# Patient Record
Sex: FEMALE | Race: BLACK OR AFRICAN AMERICAN | NOT HISPANIC OR LATINO
[De-identification: names, ages, dates, MRNs, and addresses within clinical notes are randomized per-mention and may not be internally consistent; named-entity substitution may affect disease eponyms.]

---

## 2023-02-02 ENCOUNTER — APPOINTMENT (OUTPATIENT)
Dept: SURGICAL ONCOLOGY | Facility: CLINIC | Age: 53
End: 2023-02-02
Payer: COMMERCIAL

## 2023-02-02 ENCOUNTER — RESULT REVIEW (OUTPATIENT)
Age: 53
End: 2023-02-02

## 2023-02-02 PROCEDURE — 19083 BX BREAST 1ST LESION US IMAG: CPT

## 2023-02-02 PROCEDURE — 99205 OFFICE O/P NEW HI 60 MIN: CPT | Mod: 25

## 2023-02-03 PROBLEM — Z00.00 ENCOUNTER FOR PREVENTIVE HEALTH EXAMINATION: Status: ACTIVE | Noted: 2023-02-03

## 2023-02-09 ENCOUNTER — NON-APPOINTMENT (OUTPATIENT)
Age: 53
End: 2023-02-09

## 2023-02-11 ENCOUNTER — TRANSCRIPTION ENCOUNTER (OUTPATIENT)
Age: 53
End: 2023-02-11

## 2023-02-11 ENCOUNTER — APPOINTMENT (OUTPATIENT)
Dept: MRI IMAGING | Facility: CLINIC | Age: 53
End: 2023-02-11
Payer: COMMERCIAL

## 2023-02-11 ENCOUNTER — OUTPATIENT (OUTPATIENT)
Dept: OUTPATIENT SERVICES | Facility: HOSPITAL | Age: 53
LOS: 1 days | End: 2023-02-11

## 2023-02-11 PROCEDURE — 77049 MRI BREAST C-+ W/CAD BI: CPT | Mod: 26

## 2023-02-22 ENCOUNTER — OUTPATIENT (OUTPATIENT)
Dept: OUTPATIENT SERVICES | Facility: HOSPITAL | Age: 53
LOS: 1 days | End: 2023-02-22
Payer: COMMERCIAL

## 2023-02-22 ENCOUNTER — RESULT REVIEW (OUTPATIENT)
Age: 53
End: 2023-02-22

## 2023-02-22 ENCOUNTER — APPOINTMENT (OUTPATIENT)
Dept: ULTRASOUND IMAGING | Facility: IMAGING CENTER | Age: 53
End: 2023-02-22
Payer: COMMERCIAL

## 2023-02-22 DIAGNOSIS — C50.911 MALIGNANT NEOPLASM OF UNSPECIFIED SITE OF RIGHT FEMALE BREAST: ICD-10-CM

## 2023-02-22 PROCEDURE — 88305 TISSUE EXAM BY PATHOLOGIST: CPT | Mod: 26

## 2023-02-22 PROCEDURE — 77065 DX MAMMO INCL CAD UNI: CPT

## 2023-02-22 PROCEDURE — A4648: CPT

## 2023-02-22 PROCEDURE — 19084 BX BREAST ADD LESION US IMAG: CPT

## 2023-02-22 PROCEDURE — 19083 BX BREAST 1ST LESION US IMAG: CPT | Mod: LT

## 2023-02-22 PROCEDURE — 19083 BX BREAST 1ST LESION US IMAG: CPT

## 2023-02-22 PROCEDURE — 77065 DX MAMMO INCL CAD UNI: CPT | Mod: 26,LT

## 2023-02-22 PROCEDURE — 19084 BX BREAST ADD LESION US IMAG: CPT | Mod: RT

## 2023-02-22 PROCEDURE — 88305 TISSUE EXAM BY PATHOLOGIST: CPT

## 2023-02-23 ENCOUNTER — OUTPATIENT (OUTPATIENT)
Dept: OUTPATIENT SERVICES | Facility: HOSPITAL | Age: 53
LOS: 1 days | End: 2023-02-23
Payer: COMMERCIAL

## 2023-02-23 VITALS
DIASTOLIC BLOOD PRESSURE: 80 MMHG | WEIGHT: 179.9 LBS | HEART RATE: 79 BPM | OXYGEN SATURATION: 98 % | HEIGHT: 63 IN | TEMPERATURE: 98 F | SYSTOLIC BLOOD PRESSURE: 150 MMHG | RESPIRATION RATE: 16 BRPM

## 2023-02-23 DIAGNOSIS — I10 ESSENTIAL (PRIMARY) HYPERTENSION: ICD-10-CM

## 2023-02-23 DIAGNOSIS — C50.911 MALIGNANT NEOPLASM OF UNSPECIFIED SITE OF RIGHT FEMALE BREAST: ICD-10-CM

## 2023-02-23 DIAGNOSIS — Z98.890 OTHER SPECIFIED POSTPROCEDURAL STATES: Chronic | ICD-10-CM

## 2023-02-23 LAB
ANION GAP SERPL CALC-SCNC: 12 MMOL/L — SIGNIFICANT CHANGE UP (ref 7–14)
BUN SERPL-MCNC: 10 MG/DL — SIGNIFICANT CHANGE UP (ref 7–23)
CALCIUM SERPL-MCNC: 9.9 MG/DL — SIGNIFICANT CHANGE UP (ref 8.4–10.5)
CHLORIDE SERPL-SCNC: 103 MMOL/L — SIGNIFICANT CHANGE UP (ref 98–107)
CO2 SERPL-SCNC: 24 MMOL/L — SIGNIFICANT CHANGE UP (ref 22–31)
CREAT SERPL-MCNC: 0.81 MG/DL — SIGNIFICANT CHANGE UP (ref 0.5–1.3)
EGFR: 88 ML/MIN/1.73M2 — SIGNIFICANT CHANGE UP
GLUCOSE SERPL-MCNC: 91 MG/DL — SIGNIFICANT CHANGE UP (ref 70–99)
HCG SERPL-ACNC: <5 MIU/ML — SIGNIFICANT CHANGE UP
HCT VFR BLD CALC: 39 % — SIGNIFICANT CHANGE UP (ref 34.5–45)
HGB BLD-MCNC: 12.6 G/DL — SIGNIFICANT CHANGE UP (ref 11.5–15.5)
MCHC RBC-ENTMCNC: 28.4 PG — SIGNIFICANT CHANGE UP (ref 27–34)
MCHC RBC-ENTMCNC: 32.3 GM/DL — SIGNIFICANT CHANGE UP (ref 32–36)
MCV RBC AUTO: 87.8 FL — SIGNIFICANT CHANGE UP (ref 80–100)
NRBC # BLD: 0 /100 WBCS — SIGNIFICANT CHANGE UP (ref 0–0)
NRBC # FLD: 0 K/UL — SIGNIFICANT CHANGE UP (ref 0–0)
PLATELET # BLD AUTO: 308 K/UL — SIGNIFICANT CHANGE UP (ref 150–400)
POTASSIUM SERPL-MCNC: 3.5 MMOL/L — SIGNIFICANT CHANGE UP (ref 3.5–5.3)
POTASSIUM SERPL-SCNC: 3.5 MMOL/L — SIGNIFICANT CHANGE UP (ref 3.5–5.3)
RBC # BLD: 4.44 M/UL — SIGNIFICANT CHANGE UP (ref 3.8–5.2)
RBC # FLD: 13.7 % — SIGNIFICANT CHANGE UP (ref 10.3–14.5)
SODIUM SERPL-SCNC: 139 MMOL/L — SIGNIFICANT CHANGE UP (ref 135–145)
WBC # BLD: 5.57 K/UL — SIGNIFICANT CHANGE UP (ref 3.8–10.5)
WBC # FLD AUTO: 5.57 K/UL — SIGNIFICANT CHANGE UP (ref 3.8–10.5)

## 2023-02-23 PROCEDURE — 93010 ELECTROCARDIOGRAM REPORT: CPT

## 2023-02-23 NOTE — H&P PST ADULT - NSICDXPASTMEDICALHX_GEN_ALL_CORE_FT
PAST MEDICAL HISTORY:  HLD (hyperlipidemia)     HTN (hypertension)     Malignant neoplasm of right female breast
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
## 2023-02-23 NOTE — H&P PST ADULT - PROBLEM SELECTOR PLAN 1
Patient tentatively scheduled for  right breast lumpectomy mag seed localization x1 site right axillary sentinel node biopsy for 3/6/23. Pre-op instructions provided. Pt given verbal and written instructions with teach back on chlorhexidine shampoo and pepcid. Pt verbalized understanding with return demonstration.     Pt instructed to obtain a COVID-19 PCR 3-5 days prior to the procedure. COVID-19 PCR order placed. Pt was provided with a list of COVID-19 PCR swabbing locations and hours of operation. Pt stated understanding.     CBC BMP HCG and EKG done.  Low risk patient scheduled for a low risk procedure.  No further evaluations requested.

## 2023-02-23 NOTE — H&P PST ADULT - NSICDXFAMILYHX_GEN_ALL_CORE_FT
FAMILY HISTORY:  Father  Still living? Unknown  FHx: lung cancer, Age at diagnosis: Age Unknown    Mother  Still living? Unknown  FHx: lung cancer, Age at diagnosis: Age Unknown

## 2023-02-23 NOTE — H&P PST ADULT - ASSESSMENT
In the ED, patient was hemodynamically stable and saturating well on room air. Labs with leukocytosis (13.6), elevated procal, UA negative, blood cultures drawn, flu and covid negative. CXR with interstitial opacities suspicious for mild edema or interstitial pneumonia. Patient was given rocephin and azithromycin and admitted to hospital medicine. Discussed with patient and her son and confirmed DNR status. Her white count improved and patient was afebrile, hemodynamically stable and asymptomatic. She was tolerating po and was at her baseline. Discussed with patient's son plan of care. She was subsequently discharged to complete po course of antibiotics for cap treatment.   
malignant neoplasm of right female breast

## 2023-03-01 ENCOUNTER — NON-APPOINTMENT (OUTPATIENT)
Age: 53
End: 2023-03-01

## 2023-03-01 PROBLEM — I10 ESSENTIAL (PRIMARY) HYPERTENSION: Chronic | Status: ACTIVE | Noted: 2023-02-23

## 2023-03-01 PROBLEM — C50.911 MALIGNANT NEOPLASM OF UNSPECIFIED SITE OF RIGHT FEMALE BREAST: Chronic | Status: ACTIVE | Noted: 2023-02-23

## 2023-03-01 PROBLEM — E78.5 HYPERLIPIDEMIA, UNSPECIFIED: Chronic | Status: ACTIVE | Noted: 2023-02-23

## 2023-03-02 ENCOUNTER — APPOINTMENT (OUTPATIENT)
Dept: ULTRASOUND IMAGING | Facility: CLINIC | Age: 53
End: 2023-03-02
Payer: COMMERCIAL

## 2023-03-02 ENCOUNTER — RESULT REVIEW (OUTPATIENT)
Age: 53
End: 2023-03-02

## 2023-03-02 PROCEDURE — 19285Z: CUSTOM | Mod: RT

## 2023-03-02 PROCEDURE — 19286Z: CUSTOM | Mod: RT

## 2023-03-03 VITALS
DIASTOLIC BLOOD PRESSURE: 94 MMHG | WEIGHT: 179.9 LBS | OXYGEN SATURATION: 99 % | SYSTOLIC BLOOD PRESSURE: 156 MMHG | HEART RATE: 74 BPM | TEMPERATURE: 98 F | HEIGHT: 63 IN | RESPIRATION RATE: 18 BRPM

## 2023-03-03 NOTE — ASU PREOPERATIVE ASSESSMENT, ADULT (IPARK ONLY) - FALL HARM RISK - UNIVERSAL INTERVENTIONS
Bed in lowest position, wheels locked, appropriate side rails in place/Call bell, personal items and telephone in reach/Instruct patient to call for assistance before getting out of bed or chair/Non-slip footwear when patient is out of bed/[place] to call system/Physically safe environment - no spills, clutter or unnecessary equipment/Purposeful Proactive Rounding/Room/bathroom lighting operational, light cord in reach

## 2023-03-05 ENCOUNTER — TRANSCRIPTION ENCOUNTER (OUTPATIENT)
Age: 53
End: 2023-03-05

## 2023-03-06 ENCOUNTER — OUTPATIENT (OUTPATIENT)
Dept: OUTPATIENT SERVICES | Facility: HOSPITAL | Age: 53
LOS: 1 days | Discharge: HOME | End: 2023-03-06
Payer: COMMERCIAL

## 2023-03-06 ENCOUNTER — OUTPATIENT (OUTPATIENT)
Dept: OUTPATIENT SERVICES | Facility: HOSPITAL | Age: 53
LOS: 1 days | End: 2023-03-06
Payer: COMMERCIAL

## 2023-03-06 ENCOUNTER — APPOINTMENT (OUTPATIENT)
Dept: NUCLEAR MEDICINE | Facility: IMAGING CENTER | Age: 53
End: 2023-03-06

## 2023-03-06 ENCOUNTER — TRANSCRIPTION ENCOUNTER (OUTPATIENT)
Age: 53
End: 2023-03-06

## 2023-03-06 ENCOUNTER — APPOINTMENT (OUTPATIENT)
Dept: MAMMOGRAPHY | Facility: IMAGING CENTER | Age: 53
End: 2023-03-06
Payer: COMMERCIAL

## 2023-03-06 ENCOUNTER — RESULT REVIEW (OUTPATIENT)
Age: 53
End: 2023-03-06

## 2023-03-06 ENCOUNTER — APPOINTMENT (OUTPATIENT)
Dept: SURGICAL ONCOLOGY | Facility: AMBULATORY SURGERY CENTER | Age: 53
End: 2023-03-06

## 2023-03-06 VITALS
TEMPERATURE: 97 F | SYSTOLIC BLOOD PRESSURE: 112 MMHG | HEART RATE: 74 BPM | DIASTOLIC BLOOD PRESSURE: 64 MMHG | OXYGEN SATURATION: 97 %

## 2023-03-06 DIAGNOSIS — C50.911 MALIGNANT NEOPLASM OF UNSPECIFIED SITE OF RIGHT FEMALE BREAST: ICD-10-CM

## 2023-03-06 DIAGNOSIS — Z98.890 OTHER SPECIFIED POSTPROCEDURAL STATES: Chronic | ICD-10-CM

## 2023-03-06 DIAGNOSIS — Z00.8 ENCOUNTER FOR OTHER GENERAL EXAMINATION: ICD-10-CM

## 2023-03-06 PROCEDURE — 19301 PARTIAL MASTECTOMY: CPT | Mod: RT

## 2023-03-06 PROCEDURE — 76098 X-RAY EXAM SURGICAL SPECIMEN: CPT

## 2023-03-06 PROCEDURE — 88342 IMHCHEM/IMCYTCHM 1ST ANTB: CPT | Mod: 26

## 2023-03-06 PROCEDURE — 88307 TISSUE EXAM BY PATHOLOGIST: CPT | Mod: 26

## 2023-03-06 PROCEDURE — 76098 X-RAY EXAM SURGICAL SPECIMEN: CPT | Mod: 26

## 2023-03-06 PROCEDURE — 38900 IO MAP OF SENT LYMPH NODE: CPT | Mod: RT

## 2023-03-06 PROCEDURE — 88377 M/PHMTRC ALYS ISHQUANT/SEMIQ: CPT | Mod: 26

## 2023-03-06 PROCEDURE — 38792 RA TRACER ID OF SENTINL NODE: CPT | Mod: 59,RT

## 2023-03-06 PROCEDURE — 88360 TUMOR IMMUNOHISTOCHEM/MANUAL: CPT | Mod: 26

## 2023-03-06 PROCEDURE — 88341 IMHCHEM/IMCYTCHM EA ADD ANTB: CPT | Mod: 26

## 2023-03-06 PROCEDURE — 38525 BIOPSY/REMOVAL LYMPH NODES: CPT | Mod: RT

## 2023-03-06 PROCEDURE — 14001 TIS TRNFR TRUNK 10.1-30SQCM: CPT

## 2023-03-06 PROCEDURE — 88305 TISSUE EXAM BY PATHOLOGIST: CPT | Mod: 26

## 2023-03-06 DEVICE — SURGICEL FIBRILLAR 2 X 4": Type: IMPLANTABLE DEVICE | Status: FUNCTIONAL

## 2023-03-06 DEVICE — ARISTA 3GR: Type: IMPLANTABLE DEVICE | Status: FUNCTIONAL

## 2023-03-06 RX ORDER — ACETAMINOPHEN 500 MG
1 TABLET ORAL
Qty: 28 | Refills: 0
Start: 2023-03-06 | End: 2023-03-12

## 2023-03-06 RX ORDER — ACETAMINOPHEN 500 MG
1 TABLET ORAL
Qty: 0 | Refills: 0 | DISCHARGE

## 2023-03-06 RX ORDER — OXYCODONE HYDROCHLORIDE 5 MG/1
1 TABLET ORAL
Qty: 12 | Refills: 0
Start: 2023-03-06

## 2023-03-06 NOTE — ASU DISCHARGE PLAN (ADULT/PEDIATRIC) - NURSING INSTRUCTIONS
Progress to regular diet as tolerated.  Keep well hydrated.   When taking pain/narcotic medications - take with food as it can cause nausea if taken on an empty stomach, and know it may cause constipation. To prevent constipation increase fluids and fiber in diet. You may take stool softeners (such as Colace) and follow directions as printed on bottle. - Do NOT drive while on narcotics.   Next dose of Tylenol at 5pm

## 2023-03-06 NOTE — ASU DISCHARGE PLAN (ADULT/PEDIATRIC) - ASU DC SPECIAL INSTRUCTIONSFT
[facility]  CANCER  I  N  S  T  I  T  U  T E     Department of Surgery  Division of Surgical Oncology    Rene Galindo M.D., SUNIL.  Chief, Division of Surgical Oncology    Shamar Jensen M.D., F.A.C.S., F.A.S.LOUISE.  Associate , Department of Surgery    Saeid Allred M.D., ILDA.NURIA.C.S.  Everardo Hidalgo M.D., F.NURIA.C.S.  Max Love M.D., F.A.C.S.  Cralos Valencia M.D., F.A.C.S.  Everardo Ritchie M.D., F.A.C.S.  Surjit Byrne M.D., FELICEC.S.      Breast Biopsy/Lumpectomy Post-operative Instructions  1.	Supportive bra- Please bring a sports/athletic bra with you on the day of your surgery.  You will wear it home from the hospital.  You should wear the supportive bra continuously for 48 hours after the procedure, including wearing it to sleep.  Therefore, you may wear your regular bra.  You may remove the bra to shower.  The sports bra will provide support, decrease the amount of swelling at the biopsy site, and make your recovery more comfortable.    2.	Wound dressing- There are white surgical tapes (called steri strips) which are directly adherent to the skin.  These should remain on the skin, and will peel off naturally.  All of the stitches are “internal” and will dissolve naturally.    3.	Showering/Bathing- You may shower over the dressing the very next day after surgery.  Allow the water to run over the dressing, but don not scrub the area.  It is best not to sit in a bathtub or swimming pool for at least one week after surgery.    4.	Activity level- You may resume most normal daily activity as tolerated, but avoid strenuous activities such as aerobics, jogging, exercising or heavy lifting for at least 1 week after surgery.  You may return to work in 1-2 days after surgery.  You may drive as long as you are not taking any prescription pain medication.    5.	Pain Medication- You may take the prescribed medication, or you may take extra-strength Tylenol as needed.  Please don't take aspirin, Motrin, Advil or any other anti-inflammatory medications, as these medications may cause bleeding or bruising.    6.	Follow-up Appointment- Please call the office to schedule your post-operative appointment which should be approximately 10 days after your surgery. Office [phone]    7.	Bruising/Bleeding/Swelling- It is normal for there to be some bruising and swelling at the breast biopsy site, and there may be some staining of blood on to the dressing.  Some discomfort at the surgical site is expected.  If your symptoms seem excessive, or if you have any question or concerns, please call the office.      Anesthesia Precautions:  For the next 12 hours do not:   •	drive a car,  •	drink alcohol, beer, or wine,   •	make important personal or business decisions  Diet:   •	Progress diet slowly unless otherwise indicated    Physician Notification  •	Any Prescription issues  •	Bleeding that does not stop  •	Persistent nausea and vomiting  •	Pain not relieved by medications  •	Fever greater than 101®F  •	Inability to tolerate liquids or foods  •	Unable to urinate after 8 hours  Discharge and Disposition  •	Discharge to home/ group home/assisted living  •	Accompanied by Family/Spouse/ Parents/ Significant Other/ Friend/ and or Caregiver    Follow Up Care:  •	In the event that you develop a complication and you are unable to reach your own physician, you may contact:  [phone] or go to the nearest Emergency Room.   •	Please call your surgeon to schedule your follow up appointment [phone] [facility]  CANCER  I  N  S  T  I  T  U  T E     Department of Surgery  Division of Surgical Oncology    Rene Galindo M.D., SUNIL.  Chief, Division of Surgical Oncology    Shamar Jensen M.D., F.A.C.S., F.A.S.LOUISE.  Associate , Department of Surgery    Saeid Allred M.D., ILDA.NURIA.C.S.  Everardo Hidalgo M.D., F.NURIA.C.S.  Max Love M.D., F.A.C.S.  Carlos Valencia M.D., F.A.C.S.  Everardo Ritchie M.D., F.A.C.S.  Surjit Byrne M.D., FELICEC.S.      Breast Biopsy/Lumpectomy Post-operative Instructions  1.	Supportive bra- Please bring a sports/athletic bra with you on the day of your surgery.  You will wear it home from the hospital.  You should wear the supportive bra continuously for 48 hours after the procedure, including wearing it to sleep.  Therefore, you may wear your regular bra.  You may remove the bra to shower.  The sports bra will provide support, decrease the amount of swelling at the biopsy site, and make your recovery more comfortable.        2.	Wound dressing- There are white surgical tapes (called steri strips) which are directly adherent to the skin.  These should remain on the skin, and will peel off naturally.  All of the stitches are “internal” and will dissolve naturally.        3.	Showering/Bathing- You may shower over the dressing the very next day after surgery.  Allow the water to run over the dressing, but don not scrub the area.  It is best not to sit in a bathtub or swimming pool for at least one week after surgery.        4.	Activity level- You may resume most normal daily activity as tolerated, but avoid strenuous activities such as aerobics, jogging, exercising or heavy lifting for at least 1 week after surgery.  You may return to work in 1-2 days after surgery.  You may drive as long as you are not taking any prescription pain medication.        5.	Pain Medication- You may take the prescribed medication, or you may take extra-strength Tylenol as needed.  Please don't take aspirin, Motrin, Advil or any other anti-inflammatory medications, as these medications may cause bleeding or bruising.        6.	Follow-up Appointment- Please call the office to schedule your post-operative appointment which should be approximately 10 days after your surgery. Office [phone]        7.	Bruising/Bleeding/Swelling- It is normal for there to be some bruising and swelling at the breast biopsy site, and there may be some staining of blood on to the dressing.  Some discomfort at the surgical site is expected.  If your symptoms seem excessive, or if you have any question or concerns, please call the office.          Anesthesia Precautions:  For the next 12 hours do not:   •	drive a car,  •	drink alcohol, beer, or wine,   •	make important personal or business decisions  Diet:   •	Progress diet slowly unless otherwise indicated    Physician Notification  •	Any Prescription issues  •	Bleeding that does not stop  •	Persistent nausea and vomiting  •	Pain not relieved by medications  •	Fever greater than 101®F  •	Inability to tolerate liquids or foods  •	Unable to urinate after 8 hours  Discharge and Disposition  •	Discharge to home/ group home/assisted living  •	Accompanied by Family/Spouse/ Parents/ Significant Other/ Friend/ and or Caregiver    Follow Up Care:  •	In the event that you develop a complication and you are unable to reach your own physician, you may contact:  [phone] or go to the nearest Emergency Room.   •	Please call your surgeon to schedule your follow up appointment [phone]

## 2023-03-06 NOTE — ASU DISCHARGE PLAN (ADULT/PEDIATRIC) - DISCHARGE TO
Chief Complaint   Patient presents with    Chest Pain Adult    Shortness of Breath     Pt presents to ED with complaints of chest pain and shortness of breath. Patient describes pain as bilateral rib pain. Endorses cough, congestion, sore throat, and vomiting. Patient states that her son has influenza A and patient has not received a flu shot this year.    
Home

## 2023-03-14 ENCOUNTER — NON-APPOINTMENT (OUTPATIENT)
Age: 53
End: 2023-03-14

## 2023-03-14 LAB — SURGICAL PATHOLOGY STUDY: SIGNIFICANT CHANGE UP

## 2023-03-16 ENCOUNTER — APPOINTMENT (OUTPATIENT)
Dept: SURGICAL ONCOLOGY | Facility: CLINIC | Age: 53
End: 2023-03-16

## 2023-03-16 ENCOUNTER — RESULT REVIEW (OUTPATIENT)
Age: 53
End: 2023-03-16

## 2023-03-21 ENCOUNTER — OUTPATIENT (OUTPATIENT)
Dept: OUTPATIENT SERVICES | Facility: HOSPITAL | Age: 53
LOS: 1 days | End: 2023-03-21
Payer: COMMERCIAL

## 2023-03-21 ENCOUNTER — APPOINTMENT (OUTPATIENT)
Dept: NUCLEAR MEDICINE | Facility: IMAGING CENTER | Age: 53
End: 2023-03-21
Payer: COMMERCIAL

## 2023-03-21 ENCOUNTER — APPOINTMENT (OUTPATIENT)
Dept: CT IMAGING | Facility: IMAGING CENTER | Age: 53
End: 2023-03-21
Payer: COMMERCIAL

## 2023-03-21 DIAGNOSIS — Z98.890 OTHER SPECIFIED POSTPROCEDURAL STATES: Chronic | ICD-10-CM

## 2023-03-21 DIAGNOSIS — Z00.8 ENCOUNTER FOR OTHER GENERAL EXAMINATION: ICD-10-CM

## 2023-03-21 DIAGNOSIS — C50.911 MALIGNANT NEOPLASM OF UNSPECIFIED SITE OF RIGHT FEMALE BREAST: ICD-10-CM

## 2023-03-21 PROCEDURE — 71260 CT THORAX DX C+: CPT | Mod: 26

## 2023-03-21 PROCEDURE — A9561: CPT

## 2023-03-21 PROCEDURE — 74177 CT ABD & PELVIS W/CONTRAST: CPT

## 2023-03-21 PROCEDURE — 74177 CT ABD & PELVIS W/CONTRAST: CPT | Mod: 26

## 2023-03-21 PROCEDURE — 78306 BONE IMAGING WHOLE BODY: CPT | Mod: 26

## 2023-03-21 PROCEDURE — 71260 CT THORAX DX C+: CPT

## 2023-03-21 PROCEDURE — 78306 BONE IMAGING WHOLE BODY: CPT

## 2023-03-22 ENCOUNTER — APPOINTMENT (OUTPATIENT)
Dept: SURGICAL ONCOLOGY | Facility: CLINIC | Age: 53
End: 2023-03-22

## 2023-03-23 ENCOUNTER — OUTPATIENT (OUTPATIENT)
Dept: OUTPATIENT SERVICES | Facility: HOSPITAL | Age: 53
LOS: 1 days | Discharge: HOME | End: 2023-03-23

## 2023-03-23 ENCOUNTER — APPOINTMENT (OUTPATIENT)
Age: 53
End: 2023-03-23
Payer: COMMERCIAL

## 2023-03-23 ENCOUNTER — NON-APPOINTMENT (OUTPATIENT)
Age: 53
End: 2023-03-23

## 2023-03-23 ENCOUNTER — APPOINTMENT (OUTPATIENT)
Dept: MULTI SPECIALTY CLINIC | Facility: CLINIC | Age: 53
End: 2023-03-23
Payer: COMMERCIAL

## 2023-03-23 VITALS — RESPIRATION RATE: 18 BRPM | SYSTOLIC BLOOD PRESSURE: 132 MMHG | HEART RATE: 80 BPM | DIASTOLIC BLOOD PRESSURE: 82 MMHG

## 2023-03-23 VITALS — HEIGHT: 63 IN | WEIGHT: 182 LBS | BODY MASS INDEX: 32.25 KG/M2

## 2023-03-23 DIAGNOSIS — I10 ESSENTIAL (PRIMARY) HYPERTENSION: ICD-10-CM

## 2023-03-23 DIAGNOSIS — N83.292 OTHER OVARIAN CYST, LEFT SIDE: ICD-10-CM

## 2023-03-23 DIAGNOSIS — Z87.828 PERSONAL HISTORY OF OTHER (HEALED) PHYSICAL INJURY AND TRAUMA: ICD-10-CM

## 2023-03-23 DIAGNOSIS — Z98.890 OTHER SPECIFIED POSTPROCEDURAL STATES: Chronic | ICD-10-CM

## 2023-03-23 DIAGNOSIS — Z78.9 OTHER SPECIFIED HEALTH STATUS: ICD-10-CM

## 2023-03-23 DIAGNOSIS — M19.90 UNSPECIFIED OSTEOARTHRITIS, UNSPECIFIED SITE: ICD-10-CM

## 2023-03-23 DIAGNOSIS — D86.83 SARCOID IRIDOCYCLITIS: ICD-10-CM

## 2023-03-23 DIAGNOSIS — Z80.1 FAMILY HISTORY OF MALIGNANT NEOPLASM OF TRACHEA, BRONCHUS AND LUNG: ICD-10-CM

## 2023-03-23 DIAGNOSIS — N95.1 MENOPAUSAL AND FEMALE CLIMACTERIC STATES: ICD-10-CM

## 2023-03-23 PROCEDURE — 99205 OFFICE O/P NEW HI 60 MIN: CPT

## 2023-03-23 PROCEDURE — 99204 OFFICE O/P NEW MOD 45 MIN: CPT | Mod: GC,25

## 2023-03-23 RX ORDER — TELMISARTAN 20 MG/1
TABLET ORAL
Refills: 0 | Status: ACTIVE | COMMUNITY

## 2023-03-23 RX ORDER — ROSUVASTATIN CALCIUM 20 MG/1
20 TABLET, FILM COATED ORAL
Refills: 0 | Status: ACTIVE | COMMUNITY

## 2023-03-23 NOTE — ASSESSMENT
[FreeTextEntry1] : Ms. JARRETT 's questions were answered to her satisfaction. \par She  expressed her  understanding and willingness to comply with the above recommendations, and  will return to the office in 2-3 weeks.\par \par \par \par \par \par \par \par

## 2023-03-23 NOTE — VITALS
[Maximal Pain Intensity: 5/10] : 5/10 [Least Pain Intensity: 0/10] : 0/10 [OTC] : OTC [80: Normal activity with effort; some signs or symptoms of disease.] : 80: Normal activity with effort; some signs or symptoms of disease.

## 2023-03-23 NOTE — PHYSICAL EXAM
[Fully active, able to carry on all pre-disease performance without restriction] : Status 0 - Fully active, able to carry on all pre-disease performance without restriction [Normal] : affect appropriate [de-identified] : S/p right breast lumpectomy; horizontal scar well-healed.  On the and the scar breast swelling?  seroma

## 2023-03-23 NOTE — CONSULT LETTER
[Dear  ___] : Dear  [unfilled], [Consult Letter:] : I had the pleasure of evaluating your patient, [unfilled]. [Please see my note below.] : Please see my note below. [Consult Closing:] : Thank you very much for allowing me to participate in the care of this patient.  If you have any questions, please do not hesitate to contact me. [Sincerely,] : Sincerely, [FreeTextEntry2] : Max Love MD [FreeTextEntry3] : ROMEL MEJIAS M.D.\par Medical Oncology\par [facility] \par [facility]\par [address]\par [place], [place] [address]\par Telephone: [phone]\par Fax: [phone]\par \par \par \par \par \par

## 2023-03-23 NOTE — HISTORY OF PRESENT ILLNESS
[de-identified] : 52F, -American, premenopausal, PMHx HTN, OA, s/p knee surgery, referred for medical oncology consultation of right breast infiltrating ductal carcinoma diagnosed February 2023. \par \par CASE SYNOPSIS: \par 12/2/2022 routine screening mammogram ([facility] radiology) \par 1.  Right 10:00, 4-5 cm from the nipple focal asymmetry and microcalcifications for which diagnostic mammogram/sonogram recommended \par 2.  The right 10:00, 5-6 cm from the nipple mass for which diagnostic mammogram and targeted sonography is recommended. \par 3.  Left 1:00 calcifications for which diagnostic mammography recommended.  Bilateral sonography should be considered given dense breast parenchyma. \par \par 2/2/2023 US guided core needle biopsy right breast ([facility] radiology) 2.5 cm mass 10:00, N 7 cm; pathology infiltrating ductal carcinoma, DCIS, ER 90%, WV 90% HER2 equivocal, negative by CISH \par \par 2/11/2023 breast MRI ([facility]): Recently diagnosed right breast malignancy characterized by 2.9 cm mass with adjacent satellite lesions.  6 mm enhancing mass in the left inner breast for which targeted US and US guided core needle biopsy recommended.  Prominent lymph nodes in the right axilla for which targeted US evaluation recommended with possible US guided core needle biopsy. \par \par 2/22/23: US guided core biopsy of : \par 1.right axilla 10:00, N11 cm  - reactive LN, negative for metastatic carcinoma. \par 2.left breast 9:00, N 2 cm–fibroadenomatoid nodule; pseudo–angiomatous stromal hyperplasia \par \par 3/2/2023: s/p 2 Mag seed localization \par \par 3/6/2023 right breast lumpectomy with SLNB (Dr. Max Love); pathology invasive moderately differentiated ductal carcinoma 3.5 cm, ER>90%, WV> 90%, Her 2 quivocal, CISH negative;  DCIS solid and cribriform types with intermediate to high nuclear grade, focal necrosis and microcalcifications, metastatic carcinoma involving 1 lymph node (9 mm), LVI identified.  Right inferior margin DCIS <0.5 mm from inked margin (pT2,p N1a) \par \par 3/16/23 CT C/A/P: No gross evidence for metastatic disease; Few nonspecific micronodules in the lungs and mild atelectasis. 3 month follow up is recommended.\par \par Patient seen in [facility].  Appears stable and nontoxic.  Reports right breast tenderness to palpation at the site of incision and associated swelling.  No nipple discharge.\par Patient gives family history of lung cancer in both parents (smokers).  She is premenopausal; LMP 3/30/2023.  Known with uterine fibroids and ovarian cyst.  Also receiving gel injection in the right knee due to osteoarthritis.  Patient is a mother of 4, , works as a dispatcher for [employer].  Denies tobacco use and drinks EtOH socially. [FreeTextEntry1] : pending Oncotype Dx

## 2023-03-26 NOTE — PHYSICAL EXAM
[Sclera] : the sclera and conjunctiva were normal [PERRL With Normal Accommodation] : pupils were equal in size, round, reactive to light [] : no respiratory distress [Exaggerated Use Of Accessory Muscles For Inspiration] : no accessory muscle use [Breast Palpation Mass] : no palpable masses [Breast Abnormal Lactation (Galactorrhea)] : no nipple discharge [No UE Edema] : there is no upper extremity edema [Skin Color & Pigmentation] : normal skin color and pigmentation [Abdomen Soft] : soft [Nondistended] : nondistended [Supraclavicular Lymph Nodes Enlarged Bilaterally] : supraclavicular [Axillary Lymph Nodes Enlarged Bilaterally] : axillary [Normal] : oriented to person, place and time, the affect was normal, the mood was normal and not anxious [de-identified] : clean and dry incision right upper outer breast, mild edema, no erythema, tender to palpation [de-identified] : ROM limited at right arm, unable to abduct arm above her head

## 2023-03-26 NOTE — LETTER CLOSING
[Consult Closing:] : Thank you for allowing me to participate in the care of this patient.  If you have any questions, please do not hesitate to contact me. [Sincerely yours,] : Sincerely yours, [FreeTextEntry3] : Ramya Mendez MD\par

## 2023-03-26 NOTE — HISTORY OF PRESENT ILLNESS
[FreeTextEntry1] : Ms. Mckinley is a 51 yo female with newly diagnosed right breast cancer, who presents to multidisciplinary clinic to discuss treatment recommendations.\par \par She was undergoing routine screening mammography. Her last normal mammogram was from March 2021. The next screening mammography on 12/2/22 showed a focal asymmetry with calcifications in the right breast at 10:00, nipple mass, questionable calcifications in the left breast at 1:00\par \par Diagnostic mammography on 1/20/23 showed a spiculated mass in the right upper outer breast with pleomorphic microcalcifications. Breast ultrasound showed an ill-defined hypoechoic mass in the right breast at 10:00, 7 cm FN, measuring up to 26 mm. A smoothly marginated ovoid hypoechoic solid nodule was located at 10:30 8 cm FN, measuring up to 9 mm. There was no adenopathy. \par \par Right breast biopsy on 2/3/23 showed invasive moderately differentiated ductal carcinoma, Anthony score 6/9, measuring at least 14 mm. DCIS was also present, solid pattern with intermediate nuclear grade atypia, and microcalcifications. There was no lymphvascular invasion.  IHC showed ER >90%, [place] >90% and HER2 2+ non-amplified by [facility]. \par \par Breast MRI on 2/11/23 showed a 2.9 cm enhancing mass in the right upper outer breast with biopsy marker. Several satellite lesions were noted. There was a 6 mm enhancing focus in the left inner retroareolar breast. Several lymph nodes in the right axilla displayed cortical thickening, the largest measuring 8 mm. \par \par Ultrasound guided biopsy of the left breast lesion on 2/22/23 showed benign fibroadenomatoid nodule. Biopsy of the right axillary lymph node showed reactive node and was negative for carcinoma.\par \par She underwent right breast lumpectomy and sentinel lymph node biopsy on 3/6/23 showing invasive moderately differentiated ductal carcinoma measuring 3.5 cm. DCIS was also present, with solid and cribriform patterns, intermediate to high nuclear grade, focal necrosis and calcifications. The largest extent of DCIS measured 25 mm and DCIS was present 15 out of 16 blocks. Lymphvascular invasion was identified. There was a metastatic lymph node within the lumpectomy specimen with a metastatic deposit measuring 9 mm. Right axillary sentinel lymph node biopsy showed one lymph which was positive for metastatic carcinoma measuring 2.2 mm and no extranodal extension. Shaved margins contained mostly fibrocystic change. The inferior shaved margin contained DCIS, solid and cribriform patterns, intermediate to high nuclear grade, and focal necrosis, measuring at least 5 mm, located <0.5 mm from the final margin. \par \par Extent of disease work up including CT CAP on 3/16/23 showed no evidence for metastatic disease. There were a few nonspecific micro-nodules in the lungs and mild atelectasis for which three-month follow up was recommended. A bone scan on 3/16/23 showed no evidence of osseous metastatic disease. \par \par Today she notes significant right breast tenderness to palpation and swelling particularly in the lower breast. She denies purulent drainage or any bleeding from her surgical site. She is taking Tylenol and NSAIDs as needed for pain. She denies fevers.

## 2023-04-06 ENCOUNTER — OUTPATIENT (OUTPATIENT)
Dept: OUTPATIENT SERVICES | Facility: HOSPITAL | Age: 53
LOS: 1 days | Discharge: HOME | End: 2023-04-06

## 2023-04-06 DIAGNOSIS — D64.9 ANEMIA, UNSPECIFIED: ICD-10-CM

## 2023-04-06 DIAGNOSIS — Z98.890 OTHER SPECIFIED POSTPROCEDURAL STATES: Chronic | ICD-10-CM

## 2023-04-07 ENCOUNTER — RESULT REVIEW (OUTPATIENT)
Age: 53
End: 2023-04-07

## 2023-04-07 ENCOUNTER — APPOINTMENT (OUTPATIENT)
Age: 53
End: 2023-04-07
Payer: COMMERCIAL

## 2023-04-07 VITALS
OXYGEN SATURATION: 98 % | HEART RATE: 79 BPM | DIASTOLIC BLOOD PRESSURE: 87 MMHG | BODY MASS INDEX: 32.73 KG/M2 | WEIGHT: 184.73 LBS | TEMPERATURE: 97.2 F | SYSTOLIC BLOOD PRESSURE: 134 MMHG | HEIGHT: 63 IN | RESPIRATION RATE: 16 BRPM

## 2023-04-07 LAB
25(OH)D3 SERPL-MCNC: 32 NG/ML
ALBUMIN SERPL ELPH-MCNC: 4.7 G/DL
ALP BLD-CCNC: 84 U/L
ALT SERPL-CCNC: 25 U/L
ANION GAP SERPL CALC-SCNC: 16 MMOL/L
APTT BLD: 33.7 SEC
AST SERPL-CCNC: 19 U/L
BASOPHILS # BLD AUTO: 0.04 K/UL — SIGNIFICANT CHANGE UP (ref 0–0.2)
BASOPHILS NFR BLD AUTO: 0.7 % — SIGNIFICANT CHANGE UP (ref 0–2)
BILIRUB SERPL-MCNC: 0.4 MG/DL
BUN SERPL-MCNC: 14 MG/DL
CALCIUM SERPL-MCNC: 10.1 MG/DL
CHLORIDE SERPL-SCNC: 99 MMOL/L
CO2 SERPL-SCNC: 25 MMOL/L
CREAT SERPL-MCNC: 0.86 MG/DL
EGFR: 82 ML/MIN/1.73M2
EOSINOPHIL # BLD AUTO: 0.1 K/UL — SIGNIFICANT CHANGE UP (ref 0–0.5)
EOSINOPHIL NFR BLD AUTO: 1.8 % — SIGNIFICANT CHANGE UP (ref 0–6)
ESTRADIOL SERPL-MCNC: 390 PG/ML
FSH SERPL-MCNC: 16.5 IU/L
GLUCOSE SERPL-MCNC: 115 MG/DL
HCT VFR BLD CALC: 37.9 % — SIGNIFICANT CHANGE UP (ref 34.5–45)
HGB BLD-MCNC: 12.7 G/DL — SIGNIFICANT CHANGE UP (ref 11.5–15.5)
IMM GRANULOCYTES NFR BLD AUTO: 0.4 % — SIGNIFICANT CHANGE UP (ref 0–0.9)
INR PPP: 1.03 RATIO
LH SERPL-ACNC: 50.4 IU/L
LYMPHOCYTES # BLD AUTO: 2.17 K/UL — SIGNIFICANT CHANGE UP (ref 1–3.3)
LYMPHOCYTES # BLD AUTO: 38.2 % — SIGNIFICANT CHANGE UP (ref 13–44)
MCHC RBC-ENTMCNC: 30.6 PG — SIGNIFICANT CHANGE UP (ref 27–34)
MCHC RBC-ENTMCNC: 33.5 G/DL — SIGNIFICANT CHANGE UP (ref 32–36)
MCV RBC AUTO: 91.3 FL — SIGNIFICANT CHANGE UP (ref 80–100)
MONOCYTES # BLD AUTO: 0.48 K/UL — SIGNIFICANT CHANGE UP (ref 0–0.9)
MONOCYTES NFR BLD AUTO: 8.5 % — SIGNIFICANT CHANGE UP (ref 2–14)
NEUTROPHILS # BLD AUTO: 2.87 K/UL — SIGNIFICANT CHANGE UP (ref 1.8–7.4)
NEUTROPHILS NFR BLD AUTO: 50.4 % — SIGNIFICANT CHANGE UP (ref 43–77)
NRBC # BLD: 0 /100 WBCS — SIGNIFICANT CHANGE UP (ref 0–0)
PLATELET # BLD AUTO: 257 K/UL — SIGNIFICANT CHANGE UP (ref 150–400)
POTASSIUM SERPL-SCNC: 4.2 MMOL/L
PROT SERPL-MCNC: 7.7 G/DL
PT BLD: 12 SEC
RBC # BLD: 4.15 M/UL — SIGNIFICANT CHANGE UP (ref 3.8–5.2)
RBC # FLD: 12.8 % — SIGNIFICANT CHANGE UP (ref 10.3–14.5)
SODIUM SERPL-SCNC: 141 MMOL/L
TSH SERPL-ACNC: 1.77 UIU/ML
WBC # BLD: 5.68 K/UL — SIGNIFICANT CHANGE UP (ref 3.8–10.5)
WBC # FLD AUTO: 5.68 K/UL — SIGNIFICANT CHANGE UP (ref 3.8–10.5)

## 2023-04-07 PROCEDURE — 99215 OFFICE O/P EST HI 40 MIN: CPT

## 2023-04-07 RX ORDER — ONDANSETRON 8 MG/1
8 TABLET, ORALLY DISINTEGRATING ORAL EVERY 8 HOURS
Qty: 24 | Refills: 2 | Status: ACTIVE | COMMUNITY
Start: 2023-04-07 | End: 1900-01-01

## 2023-04-07 RX ORDER — PROCHLORPERAZINE MALEATE 10 MG/1
10 TABLET ORAL EVERY 6 HOURS
Qty: 24 | Refills: 2 | Status: ACTIVE | COMMUNITY
Start: 2023-04-07 | End: 1900-01-01

## 2023-04-08 LAB
CANCER AG27-29 SERPL-ACNC: 27.9 U/ML
HBV CORE IGG+IGM SER QL: NONREACTIVE
HBV SURFACE AB SER QL: NONREACTIVE
HBV SURFACE AG SER QL: NONREACTIVE
HCV AB SER QL: NONREACTIVE
HCV S/CO RATIO: 0.13 S/CO

## 2023-04-08 NOTE — PHYSICAL EXAM
[Fully active, able to carry on all pre-disease performance without restriction] : Status 0 - Fully active, able to carry on all pre-disease performance without restriction [Normal] : normal appearance, no rash, nodules, vesicles, ulcers, erythema [de-identified] : S/p right breast lumpectomy; horizontal scar well-healed.  On the and the scar breast swelling?  seroma

## 2023-04-08 NOTE — HISTORY OF PRESENT ILLNESS
[Disease: _____________________] : Disease: [unfilled] [T: ___] : T[unfilled] [N: ___] : N[unfilled] [M: ___] : M[unfilled] [AJCC Stage: ____] : AJCC Stage: [unfilled] [de-identified] : 52F, -American, premenopausal, PMHx HTN, OA, s/p knee surgery, referred for medical oncology consultation of right breast infiltrating ductal carcinoma diagnosed February 2023. \par \par CASE SYNOPSIS: \par 12/2/2022 routine screening mammogram ([facility] radiology) \par 1.  Right 10:00, 4-5 cm from the nipple focal asymmetry and microcalcifications for which diagnostic mammogram/sonogram recommended \par 2.  The right 10:00, 5-6 cm from the nipple mass for which diagnostic mammogram and targeted sonography is recommended. \par 3.  Left 1:00 calcifications for which diagnostic mammography recommended.  Bilateral sonography should be considered given dense breast parenchyma. \par \par 2/2/2023 US guided core needle biopsy right breast ([facility] radiology) 2.5 cm mass 10:00, N 7 cm; pathology infiltrating ductal carcinoma, DCIS, ER 90%, MD 90% HER2 equivocal, negative by CISH \par \par 2/11/2023 breast MRI ([facility]): Recently diagnosed right breast malignancy characterized by 2.9 cm mass with adjacent satellite lesions.  6 mm enhancing mass in the left inner breast for which targeted US and US guided core needle biopsy recommended.  Prominent lymph nodes in the right axilla for which targeted US evaluation recommended with possible US guided core needle biopsy. \par \par 2/22/23: US guided core biopsy of : \par 1.right axilla 10:00, N11 cm  - reactive LN, negative for metastatic carcinoma. \par 2.left breast 9:00, N 2 cm–fibroadenomatoid nodule; pseudo–angiomatous stromal hyperplasia \par \par 3/2/2023: s/p 2 Mag seed localization \par \par 3/6/2023 right breast lumpectomy with SLNB (Dr. Max Love); pathology invasive moderately differentiated ductal carcinoma 3.5 cm, ER>90%, MD> 90%, Her 2 equivocal, CISH negative;  DCIS solid and cribriform types with intermediate to high nuclear grade, focal necrosis and microcalcifications, metastatic carcinoma involving 1 lymph node (9 mm), LVI identified.  Right inferior margin DCIS <0.5 mm from inked margin (pT2,p N1a) \par \par 3/16/23 CT C/A/P: No gross evidence for metastatic disease; Few nonspecific micronodules in the lungs and mild atelectasis. 3 month follow up is recommended.\par \par 3/31/2023: Oncotype DX 27 (distant recurrence risk at 9 years 21%) [de-identified] : Moderately differentiated invasive ductal carcinoma [de-identified] : ER>90%, [place]> 90%, Her 2 equivocal, CISH negative [FreeTextEntry1] : pending Oncotype Dx [de-identified] : Patient seen in office on 3/23/2023; case discussed at the breast multidisciplinary conference the same day, and decision was pending the results of Oncotype DX.\par In interim, Oncotype DX score returned at 27 (high) with a 21% risk of disease recurrence at 9 years.  Patient is here to discuss adjuvant systemic therapy.\par She has recovered well after lumpectomy/SLND on 3/6/2023.  She has not returned to work (works for [employer]); in fact she was approved for 1 year leave of absence to undergo treatment for her newly diagnosed malignancy.  Denies new constitutional symptoms.  Local wound has healed and right breast tenderness at the site of incision has resolved.  She is here accompanied by son, Omar.\par \par \par

## 2023-04-20 ENCOUNTER — APPOINTMENT (OUTPATIENT)
Dept: SURGICAL ONCOLOGY | Facility: CLINIC | Age: 53
End: 2023-04-20
Payer: COMMERCIAL

## 2023-04-20 PROCEDURE — 99024 POSTOP FOLLOW-UP VISIT: CPT

## 2023-04-21 ENCOUNTER — APPOINTMENT (OUTPATIENT)
Dept: CARDIOLOGY | Facility: CLINIC | Age: 53
End: 2023-04-21
Payer: COMMERCIAL

## 2023-04-21 PROCEDURE — 93306 TTE W/DOPPLER COMPLETE: CPT

## 2023-04-26 ENCOUNTER — NON-APPOINTMENT (OUTPATIENT)
Age: 53
End: 2023-04-26

## 2023-04-27 ENCOUNTER — TRANSCRIPTION ENCOUNTER (OUTPATIENT)
Age: 53
End: 2023-04-27

## 2023-04-27 ENCOUNTER — RESULT REVIEW (OUTPATIENT)
Age: 53
End: 2023-04-27

## 2023-04-27 ENCOUNTER — OUTPATIENT (OUTPATIENT)
Dept: OUTPATIENT SERVICES | Facility: HOSPITAL | Age: 53
LOS: 1 days | End: 2023-04-27
Payer: COMMERCIAL

## 2023-04-27 VITALS
WEIGHT: 179.9 LBS | HEIGHT: 63 IN | SYSTOLIC BLOOD PRESSURE: 153 MMHG | TEMPERATURE: 98 F | DIASTOLIC BLOOD PRESSURE: 100 MMHG | HEART RATE: 91 BPM | RESPIRATION RATE: 18 BRPM | OXYGEN SATURATION: 100 %

## 2023-04-27 VITALS
DIASTOLIC BLOOD PRESSURE: 86 MMHG | HEART RATE: 74 BPM | RESPIRATION RATE: 17 BRPM | SYSTOLIC BLOOD PRESSURE: 114 MMHG | OXYGEN SATURATION: 100 %

## 2023-04-27 DIAGNOSIS — C50.911 MALIGNANT NEOPLASM OF UNSPECIFIED SITE OF RIGHT FEMALE BREAST: ICD-10-CM

## 2023-04-27 DIAGNOSIS — Z98.890 OTHER SPECIFIED POSTPROCEDURAL STATES: Chronic | ICD-10-CM

## 2023-04-27 PROCEDURE — 77001 FLUOROGUIDE FOR VEIN DEVICE: CPT

## 2023-04-27 PROCEDURE — 76937 US GUIDE VASCULAR ACCESS: CPT | Mod: 26

## 2023-04-27 PROCEDURE — C1894: CPT

## 2023-04-27 PROCEDURE — C1788: CPT

## 2023-04-27 PROCEDURE — 77001 FLUOROGUIDE FOR VEIN DEVICE: CPT | Mod: 26

## 2023-04-27 PROCEDURE — C1769: CPT

## 2023-04-27 PROCEDURE — C1887: CPT

## 2023-04-27 PROCEDURE — 76937 US GUIDE VASCULAR ACCESS: CPT

## 2023-04-27 PROCEDURE — 36561 INSERT TUNNELED CV CATH: CPT

## 2023-04-27 PROCEDURE — C1892: CPT

## 2023-04-27 RX ORDER — SODIUM CHLORIDE 9 MG/ML
10 INJECTION INTRAMUSCULAR; INTRAVENOUS; SUBCUTANEOUS
Refills: 0 | Status: DISCONTINUED | OUTPATIENT
Start: 2023-04-27 | End: 2023-05-11

## 2023-04-27 RX ORDER — CHLORHEXIDINE GLUCONATE 213 G/1000ML
1 SOLUTION TOPICAL
Refills: 0 | Status: DISCONTINUED | OUTPATIENT
Start: 2023-04-27 | End: 2023-05-11

## 2023-04-27 NOTE — PROCEDURE NOTE - PROCEDURE FINDINGS AND DETAILS
Case Management Discharge Planning Note    Patient name Alverda Cockayne  Location /-42 MRN [id number]  : 1950 Date 2022       Current Admission Date: 2022  Current Admission Diagnosis:NSTEMI (non-ST elevated myocardial infarction) [facility])   Patient Active Problem List    Diagnosis Date Noted    NSTEMI (non-ST elevated [facility] infarction) [facility] ) 2022    Type 2 diabetes mellitus, without long-term current use of insulin ([facility] ) 2022    BPH (benign prostatic hyperplasia) 2022    KASEY (obstructive sleep apnea) 2022    CKD (chronic kidney disease) 2022      LOS (days): 1  Geometric Mean LOS (GMLOS) (days): 2 20  Days to GMLOS:0 8     OBJECTIVE:  Risk of Unplanned Readmission Score: 8         Current admission status: Inpatient   Preferred Pharmacy:   [address] [place] [address], [address] [place] [address]  Phone: [phone] Fax: [phone]    [facility] [address] [address]  Phone: [phone] Fax: [phone]    [id number] [address]  Phone: [phone] Fax: [phone]    Primary Care Provider: No primary care provider on file  Primary Insurance: [address] [place] [address]  Secondary Insurance:     DISCHARGE DETAILS:    Additional Comments: Per MAUREEN communication, [facility] able to accept for SN  Placement of left chest wall port. Tip in the SVC. Ok to use Mediport.

## 2023-04-27 NOTE — ASU DISCHARGE PLAN (ADULT/PEDIATRIC) - NURSING INSTRUCTIONS
Please feel free to contact us at [phone] if any problems arise. After 6PM, Monday through Friday, on weekends and on holidays, please call [phone] and ask for the radiology resident on call to be paged.

## 2023-04-27 NOTE — PRE PROCEDURE NOTE - PRE PROCEDURE EVALUATION
Vascular & Interventional Radiology Pre-Procedure Note    Procedure Name: Chest Port Placement    HPI: 52y Female with right breast cancer. Presents to IR for chest port placement on the left.    Allergies: No Known Allergies      Data:  Vital Signs Last 24 Hrs  T(C): 36.8 (27 Apr 2023 07:28), Max: 36.8 (27 Apr 2023 07:28)  T(F): 98.2 (27 Apr 2023 07:28), Max: 98.2 (27 Apr 2023 07:28)  HR: 91 (27 Apr 2023 07:28) (91 - 91)  BP: 153/100 (27 Apr 2023 07:28) (153/100 - 153/100)  BP(mean): --  RR: 18 (27 Apr 2023 07:28) (18 - 18)  SpO2: 100% (27 Apr 2023 07:28) (100% - 100%)    Plan:   -52y Female presents for left chest port placement.  -Risks/Benefits/alternatives explained with the patient and/or healthcare proxy and witnessed informed consent obtained.

## 2023-05-02 DIAGNOSIS — Z45.2 ENCOUNTER FOR ADJUSTMENT AND MANAGEMENT OF VASCULAR ACCESS DEVICE: ICD-10-CM

## 2023-05-02 DIAGNOSIS — C50.911 MALIGNANT NEOPLASM OF UNSPECIFIED SITE OF RIGHT FEMALE BREAST: ICD-10-CM

## 2023-05-05 ENCOUNTER — RESULT REVIEW (OUTPATIENT)
Age: 53
End: 2023-05-05

## 2023-05-05 ENCOUNTER — APPOINTMENT (OUTPATIENT)
Age: 53
End: 2023-05-05

## 2023-05-05 ENCOUNTER — NON-APPOINTMENT (OUTPATIENT)
Age: 53
End: 2023-05-05

## 2023-05-05 DIAGNOSIS — Z51.11 ENCOUNTER FOR ANTINEOPLASTIC CHEMOTHERAPY: ICD-10-CM

## 2023-05-05 DIAGNOSIS — R11.2 NAUSEA WITH VOMITING, UNSPECIFIED: ICD-10-CM

## 2023-05-05 DIAGNOSIS — Z51.89 ENCOUNTER FOR OTHER SPECIFIED AFTERCARE: ICD-10-CM

## 2023-05-05 LAB
BASOPHILS # BLD AUTO: 0.03 K/UL — SIGNIFICANT CHANGE UP (ref 0–0.2)
BASOPHILS NFR BLD AUTO: 0.5 % — SIGNIFICANT CHANGE UP (ref 0–2)
EOSINOPHIL # BLD AUTO: 0.09 K/UL — SIGNIFICANT CHANGE UP (ref 0–0.5)
EOSINOPHIL NFR BLD AUTO: 1.4 % — SIGNIFICANT CHANGE UP (ref 0–6)
HCT VFR BLD CALC: 33.4 % — LOW (ref 34.5–45)
HGB BLD-MCNC: 11.5 G/DL — SIGNIFICANT CHANGE UP (ref 11.5–15.5)
IMM GRANULOCYTES NFR BLD AUTO: 0.5 % — SIGNIFICANT CHANGE UP (ref 0–0.9)
LYMPHOCYTES # BLD AUTO: 2.56 K/UL — SIGNIFICANT CHANGE UP (ref 1–3.3)
LYMPHOCYTES # BLD AUTO: 39.6 % — SIGNIFICANT CHANGE UP (ref 13–44)
MCHC RBC-ENTMCNC: 30.4 PG — SIGNIFICANT CHANGE UP (ref 27–34)
MCHC RBC-ENTMCNC: 34.4 G/DL — SIGNIFICANT CHANGE UP (ref 32–36)
MCV RBC AUTO: 88.4 FL — SIGNIFICANT CHANGE UP (ref 80–100)
MONOCYTES # BLD AUTO: 0.61 K/UL — SIGNIFICANT CHANGE UP (ref 0–0.9)
MONOCYTES NFR BLD AUTO: 9.4 % — SIGNIFICANT CHANGE UP (ref 2–14)
NEUTROPHILS # BLD AUTO: 3.15 K/UL — SIGNIFICANT CHANGE UP (ref 1.8–7.4)
NEUTROPHILS NFR BLD AUTO: 48.6 % — SIGNIFICANT CHANGE UP (ref 43–77)
NRBC # BLD: 0 /100 WBCS — SIGNIFICANT CHANGE UP (ref 0–0)
PLATELET # BLD AUTO: 243 K/UL — SIGNIFICANT CHANGE UP (ref 150–400)
RBC # BLD: 3.78 M/UL — LOW (ref 3.8–5.2)
RBC # FLD: 12.7 % — SIGNIFICANT CHANGE UP (ref 10.3–14.5)
WBC # BLD: 6.47 K/UL — SIGNIFICANT CHANGE UP (ref 3.8–10.5)
WBC # FLD AUTO: 6.47 K/UL — SIGNIFICANT CHANGE UP (ref 3.8–10.5)

## 2023-05-08 ENCOUNTER — NON-APPOINTMENT (OUTPATIENT)
Age: 53
End: 2023-05-08

## 2023-05-08 DIAGNOSIS — C50.919 MALIGNANT NEOPLASM OF UNSPECIFIED SITE OF UNSPECIFIED FEMALE BREAST: ICD-10-CM

## 2023-05-10 ENCOUNTER — NON-APPOINTMENT (OUTPATIENT)
Age: 53
End: 2023-05-10

## 2023-05-19 ENCOUNTER — RESULT REVIEW (OUTPATIENT)
Age: 53
End: 2023-05-19

## 2023-05-19 ENCOUNTER — NON-APPOINTMENT (OUTPATIENT)
Age: 53
End: 2023-05-19

## 2023-05-19 ENCOUNTER — APPOINTMENT (OUTPATIENT)
Age: 53
End: 2023-05-19

## 2023-05-19 LAB
BASOPHILS # BLD AUTO: 0.13 K/UL — SIGNIFICANT CHANGE UP (ref 0–0.2)
BASOPHILS NFR BLD AUTO: 1 % — SIGNIFICANT CHANGE UP (ref 0–2)
BLASTS # FLD: 0.5 % — HIGH (ref 0–0)
EOSINOPHIL # BLD AUTO: 0.06 K/UL — SIGNIFICANT CHANGE UP (ref 0–0.5)
EOSINOPHIL NFR BLD AUTO: 0.5 % — SIGNIFICANT CHANGE UP (ref 0–6)
HCT VFR BLD CALC: 32.8 % — LOW (ref 34.5–45)
HGB BLD-MCNC: 11.1 G/DL — LOW (ref 11.5–15.5)
LYMPHOCYTES # BLD AUTO: 2.66 K/UL — SIGNIFICANT CHANGE UP (ref 1–3.3)
LYMPHOCYTES # BLD AUTO: 20.5 % — SIGNIFICANT CHANGE UP (ref 13–44)
MCHC RBC-ENTMCNC: 30.6 PG — SIGNIFICANT CHANGE UP (ref 27–34)
MCHC RBC-ENTMCNC: 33.8 G/DL — SIGNIFICANT CHANGE UP (ref 32–36)
MCV RBC AUTO: 90.4 FL — SIGNIFICANT CHANGE UP (ref 80–100)
METAMYELOCYTES # FLD: 8 % — HIGH (ref 0–0)
MONOCYTES # BLD AUTO: 0.84 K/UL — SIGNIFICANT CHANGE UP (ref 0–0.9)
MONOCYTES NFR BLD AUTO: 6.5 % — SIGNIFICANT CHANGE UP (ref 2–14)
MYELOCYTES NFR BLD: 5 % — HIGH (ref 0–0)
NEUTROPHILS # BLD AUTO: 7.52 K/UL — HIGH (ref 1.8–7.4)
NEUTROPHILS NFR BLD AUTO: 58 % — SIGNIFICANT CHANGE UP (ref 43–77)
NRBC # BLD: 1 /100 — HIGH (ref 0–0)
NRBC # BLD: SIGNIFICANT CHANGE UP /100 WBCS (ref 0–0)
PLAT MORPH BLD: NORMAL — SIGNIFICANT CHANGE UP
PLATELET # BLD AUTO: 121 K/UL — LOW (ref 150–400)
RBC # BLD: 3.63 M/UL — LOW (ref 3.8–5.2)
RBC # FLD: 13.1 % — SIGNIFICANT CHANGE UP (ref 10.3–14.5)
RBC BLD AUTO: SIGNIFICANT CHANGE UP
WBC # BLD: 12.96 K/UL — HIGH (ref 3.8–10.5)
WBC # FLD AUTO: 12.96 K/UL — HIGH (ref 3.8–10.5)

## 2023-05-25 ENCOUNTER — OUTPATIENT (OUTPATIENT)
Dept: OUTPATIENT SERVICES | Facility: HOSPITAL | Age: 53
LOS: 1 days | Discharge: HOME | End: 2023-05-25

## 2023-05-25 DIAGNOSIS — D64.9 ANEMIA, UNSPECIFIED: ICD-10-CM

## 2023-05-25 DIAGNOSIS — Z98.890 OTHER SPECIFIED POSTPROCEDURAL STATES: Chronic | ICD-10-CM

## 2023-06-01 ENCOUNTER — NON-APPOINTMENT (OUTPATIENT)
Age: 53
End: 2023-06-01

## 2023-06-08 ENCOUNTER — NON-APPOINTMENT (OUTPATIENT)
Age: 53
End: 2023-06-08

## 2023-06-08 ENCOUNTER — RESULT REVIEW (OUTPATIENT)
Age: 53
End: 2023-06-08

## 2023-06-08 ENCOUNTER — APPOINTMENT (OUTPATIENT)
Age: 53
End: 2023-06-08

## 2023-06-08 ENCOUNTER — APPOINTMENT (OUTPATIENT)
Age: 53
End: 2023-06-08
Payer: COMMERCIAL

## 2023-06-08 DIAGNOSIS — R11.2 NAUSEA WITH VOMITING, UNSPECIFIED: ICD-10-CM

## 2023-06-08 DIAGNOSIS — C50.919 MALIGNANT NEOPLASM OF UNSPECIFIED SITE OF UNSPECIFIED FEMALE BREAST: ICD-10-CM

## 2023-06-08 DIAGNOSIS — Z51.11 ENCOUNTER FOR ANTINEOPLASTIC CHEMOTHERAPY: ICD-10-CM

## 2023-06-08 DIAGNOSIS — Z51.89 ENCOUNTER FOR OTHER SPECIFIED AFTERCARE: ICD-10-CM

## 2023-06-08 LAB
BASOPHILS # BLD AUTO: 0.1 K/UL — SIGNIFICANT CHANGE UP (ref 0–0.2)
BASOPHILS NFR BLD AUTO: 1 % — SIGNIFICANT CHANGE UP (ref 0–2)
BLASTS # FLD: 0.5 % — HIGH (ref 0–0)
DACRYOCYTES BLD QL SMEAR: SLIGHT — SIGNIFICANT CHANGE UP
EOSINOPHIL # BLD AUTO: 0 K/UL — SIGNIFICANT CHANGE UP (ref 0–0.5)
EOSINOPHIL NFR BLD AUTO: 0 % — SIGNIFICANT CHANGE UP (ref 0–6)
HCT VFR BLD CALC: 29.7 % — LOW (ref 34.5–45)
HGB BLD-MCNC: 9.9 G/DL — LOW (ref 11.5–15.5)
LYMPHOCYTES # BLD AUTO: 1.63 K/UL — SIGNIFICANT CHANGE UP (ref 1–3.3)
LYMPHOCYTES # BLD AUTO: 17 % — SIGNIFICANT CHANGE UP (ref 13–44)
MCHC RBC-ENTMCNC: 31 PG — SIGNIFICANT CHANGE UP (ref 27–34)
MCHC RBC-ENTMCNC: 33.3 G/DL — SIGNIFICANT CHANGE UP (ref 32–36)
MCV RBC AUTO: 93.1 FL — SIGNIFICANT CHANGE UP (ref 80–100)
METAMYELOCYTES # FLD: 1 % — HIGH (ref 0–0)
MONOCYTES # BLD AUTO: 0.96 K/UL — HIGH (ref 0–0.9)
MONOCYTES NFR BLD AUTO: 10 % — SIGNIFICANT CHANGE UP (ref 2–14)
MYELOCYTES NFR BLD: 3.5 % — HIGH (ref 0–0)
NEUTROPHILS # BLD AUTO: 6.41 K/UL — SIGNIFICANT CHANGE UP (ref 1.8–7.4)
NEUTROPHILS NFR BLD AUTO: 67 % — SIGNIFICANT CHANGE UP (ref 43–77)
NRBC # BLD: 3 /100 — HIGH (ref 0–0)
NRBC # BLD: SIGNIFICANT CHANGE UP /100 WBCS (ref 0–0)
PLAT MORPH BLD: NORMAL — SIGNIFICANT CHANGE UP
PLATELET # BLD AUTO: 188 K/UL — SIGNIFICANT CHANGE UP (ref 150–400)
POIKILOCYTOSIS BLD QL AUTO: SLIGHT — SIGNIFICANT CHANGE UP
POLYCHROMASIA BLD QL SMEAR: SLIGHT — SIGNIFICANT CHANGE UP
RBC # BLD: 3.19 M/UL — LOW (ref 3.8–5.2)
RBC # FLD: 15.9 % — HIGH (ref 10.3–14.5)
RBC BLD AUTO: ABNORMAL
WBC # BLD: 9.56 K/UL — SIGNIFICANT CHANGE UP (ref 3.8–10.5)
WBC # FLD AUTO: 9.56 K/UL — SIGNIFICANT CHANGE UP (ref 3.8–10.5)

## 2023-06-08 PROCEDURE — 99214 OFFICE O/P EST MOD 30 MIN: CPT

## 2023-06-09 LAB
ALBUMIN SERPL ELPH-MCNC: 4.5 G/DL — SIGNIFICANT CHANGE UP (ref 3.3–5)
ALP SERPL-CCNC: 93 U/L — SIGNIFICANT CHANGE UP (ref 40–120)
ALT FLD-CCNC: 18 U/L — SIGNIFICANT CHANGE UP (ref 10–45)
ANION GAP SERPL CALC-SCNC: 13 MMOL/L — SIGNIFICANT CHANGE UP (ref 5–17)
AST SERPL-CCNC: 19 U/L — SIGNIFICANT CHANGE UP (ref 10–40)
BILIRUB SERPL-MCNC: 0.2 MG/DL — SIGNIFICANT CHANGE UP (ref 0.2–1.2)
BUN SERPL-MCNC: 10 MG/DL — SIGNIFICANT CHANGE UP (ref 7–23)
CALCIUM SERPL-MCNC: 9.8 MG/DL — SIGNIFICANT CHANGE UP (ref 8.4–10.5)
CHLORIDE SERPL-SCNC: 105 MMOL/L — SIGNIFICANT CHANGE UP (ref 96–108)
CO2 SERPL-SCNC: 23 MMOL/L — SIGNIFICANT CHANGE UP (ref 22–31)
CREAT SERPL-MCNC: 0.78 MG/DL — SIGNIFICANT CHANGE UP (ref 0.5–1.3)
EGFR: 92 ML/MIN/1.73M2 — SIGNIFICANT CHANGE UP
GLUCOSE SERPL-MCNC: 97 MG/DL — SIGNIFICANT CHANGE UP (ref 70–99)
POTASSIUM SERPL-MCNC: 4.2 MMOL/L — SIGNIFICANT CHANGE UP (ref 3.5–5.3)
POTASSIUM SERPL-SCNC: 4.2 MMOL/L — SIGNIFICANT CHANGE UP (ref 3.5–5.3)
PROT SERPL-MCNC: 7.2 G/DL — SIGNIFICANT CHANGE UP (ref 6–8.3)
SODIUM SERPL-SCNC: 141 MMOL/L — SIGNIFICANT CHANGE UP (ref 135–145)

## 2023-06-10 NOTE — PHYSICAL EXAM
[Normal] : normal appearance, no rash, nodules, vesicles, ulcers, erythema [Restricted in physically strenuous activity but ambulatory and able to carry out work of a light or sedentary nature] : Status 1- Restricted in physically strenuous activity but ambulatory and able to carry out work of a light or sedentary nature, e.g., light house work, office work [de-identified] : S/p right breast lumpectomy; horizontal scar well-healed.  On the and the scar breast swelling?  seroma

## 2023-06-10 NOTE — ASSESSMENT
[FreeTextEntry1] : Ms. JARRETT 's questions were answered to her satisfaction. \par She  expressed her  understanding and willingness to comply with the above recommendations, and  will return to the office in 3 weeks.\par

## 2023-06-10 NOTE — HISTORY OF PRESENT ILLNESS
[Disease: _____________________] : Disease: [unfilled] [T: ___] : T[unfilled] [N: ___] : N[unfilled] [M: ___] : M[unfilled] [AJCC Stage: ____] : AJCC Stage: [unfilled] [de-identified] : 52F, -American, premenopausal, PMHx HTN, OA, s/p knee surgery, referred for medical oncology consultation of right breast infiltrating ductal carcinoma diagnosed February 2023. \par \par CASE SYNOPSIS: \par 12/2/2022 routine screening mammogram ([facility] radiology) \par 1.  Right 10:00, 4-5 cm from the nipple focal asymmetry and microcalcifications for which diagnostic mammogram/sonogram recommended \par 2.  The right 10:00, 5-6 cm from the nipple mass for which diagnostic mammogram and targeted sonography is recommended. \par 3.  Left 1:00 calcifications for which diagnostic mammography recommended.  Bilateral sonography should be considered given dense breast parenchyma. \par \par 2/2/2023 US guided core needle biopsy right breast ([facility] radiology) 2.5 cm mass 10:00, N 7 cm; pathology infiltrating ductal carcinoma, DCIS, ER 90%, MD 90% HER2 equivocal, negative by CISH \par \par 2/11/2023 breast MRI ([facility]): Recently diagnosed right breast malignancy characterized by 2.9 cm mass with adjacent satellite lesions.  6 mm enhancing mass in the left inner breast for which targeted US and US guided core needle biopsy recommended.  Prominent lymph nodes in the right axilla for which targeted US evaluation recommended with possible US guided core needle biopsy. \par \par 2/22/23: US guided core biopsy of : \par 1.right axilla 10:00, N11 cm  - reactive LN, negative for metastatic carcinoma. \par 2.left breast 9:00, N 2 cm–fibroadenomatoid nodule; pseudo–angiomatous stromal hyperplasia \par \par 3/2/2023: s/p 2 Mag seed localization \par \par 3/6/2023 right breast lumpectomy with SLNB (Dr. Max Love); pathology invasive moderately differentiated ductal carcinoma 3.5 cm, ER>90%, MD> 90%, Her 2 equivocal, CISH negative;  DCIS solid and cribriform types with intermediate to high nuclear grade, focal necrosis and microcalcifications, metastatic carcinoma involving 1 lymph node (9 mm), LVI identified.  Right inferior margin DCIS <0.5 mm from inked margin (pT2,p N1a) \par \par 3/16/23 CT C/A/P: No gross evidence for metastatic disease; Few nonspecific micronodules in the lungs and mild atelectasis. 3 month follow up is recommended.\par \par 3/31/2023: Oncotype DX 27 (distant recurrence risk at 9 years 21%).\par \par 4/21/2023 transthoracic echocardiogram: LVEF 68%\par \par 5/5/2023 C1 D1 Adriamycin/Cytoxan [de-identified] : Moderately differentiated invasive ductal carcinoma [de-identified] : ER>90%, VT> 90%, Her 2 equivocal, CISH negative [FreeTextEntry1] : ACT x 4 [de-identified] : Patient examined in treatment room.\par Returning for oncologic follow-up after starting adjuvant therapy with AC (first cycle 5/5/2023).  Here for C3 D1 AC.  \par Overall tolerating treatment well; symptoms include fatigue, alopecia, but no significant nausea or vomiting.\par Physical examination unchanged.  Hematologic profile consistent with further drop in hemoglobin at 11.1 g/dL and platelets at 121,000.  \par Denies fevers, infections, falls, fractures.\par Ms. JARRETT expected to complete 4 cycles of AC prior to starting paclitaxel x4.\par Accompanied by .\par \par

## 2023-06-14 ENCOUNTER — NON-APPOINTMENT (OUTPATIENT)
Age: 53
End: 2023-06-14

## 2023-06-19 ENCOUNTER — APPOINTMENT (OUTPATIENT)
Dept: OBGYN | Facility: CLINIC | Age: 53
End: 2023-06-19
Payer: COMMERCIAL

## 2023-06-19 VITALS
BODY MASS INDEX: 32.25 KG/M2 | HEIGHT: 63 IN | DIASTOLIC BLOOD PRESSURE: 96 MMHG | SYSTOLIC BLOOD PRESSURE: 152 MMHG | WEIGHT: 182 LBS

## 2023-06-19 DIAGNOSIS — Z71.9 COUNSELING, UNSPECIFIED: ICD-10-CM

## 2023-06-19 LAB
BILIRUB UR QL STRIP: NORMAL
GLUCOSE UR-MCNC: NORMAL
HCG UR QL: 1 EU/DL
HCG UR QL: NEGATIVE
HGB UR QL STRIP.AUTO: NORMAL
KETONES UR-MCNC: NORMAL
LEUKOCYTE ESTERASE UR QL STRIP: NORMAL
NITRITE UR QL STRIP: NORMAL
PH UR STRIP: 7
PROT UR STRIP-MCNC: NORMAL
SP GR UR STRIP: 1.02

## 2023-06-19 PROCEDURE — 76830 TRANSVAGINAL US NON-OB: CPT

## 2023-06-19 PROCEDURE — 99202 OFFICE O/P NEW SF 15 MIN: CPT | Mod: 25

## 2023-06-19 PROCEDURE — 81025 URINE PREGNANCY TEST: CPT

## 2023-06-19 PROCEDURE — 58100 BIOPSY OF UTERUS LINING: CPT

## 2023-06-19 PROCEDURE — 81003 URINALYSIS AUTO W/O SCOPE: CPT | Mod: QW

## 2023-06-19 NOTE — HISTORY OF PRESENT ILLNESS
[FreeTextEntry1] : 53yo  LMP 6/3 here or consult regarding possible oophorectomy vs hystercotmy BSO given recent diagnosis of breast cancer DCIS with ER+ receptors.\par \par pmh obesity, R bresat cancer\par psh: lumpectomy, LN biopsies\par meds: starting chemotherapy\par all nkda\par \par obhx nsd x4 uncomplciated\par gynhx: pap 3/23 NILM; reg cycles still but heavy and clots new onset causing mild anemia, (+) fibroids; (-) sti/cysts/fam hx of cancer no

## 2023-06-19 NOTE — PROCEDURE
[Abnormal Uterine Bleeding] : abnormal uterine bleeding [Transvaginal Ultrasound] : transvaginal ultrasound [FreeTextEntry3] : RV ueterus, thin EMS, small 3cm fibroids anterior and posterior, no ff, b/l adnexa wnl

## 2023-06-19 NOTE — PLAN
[FreeTextEntry1] : f/u biopsy\par discussed at length options regardign surgical and medical mgmt. discussed lupron and prolonged use for mgmt of breast cancer and chemical cessation of estrogen production of ovaries. its use would be prolonged and need routine f/u with repeated injections. discussed also BSO as an option for surgical mgmt which she prefers; and [facility] fibroid uterus and pending recommendations of oncology. given ER+ breast ca ND negative for bleedign could do mirena IUD pending oncology recs and EMB done today \par \par surgical mgmt discussed that will need time after chemotherapy to be a suitable candidate

## 2023-06-27 LAB — CORE LAB BIOPSY: NORMAL

## 2023-06-29 ENCOUNTER — RESULT REVIEW (OUTPATIENT)
Age: 53
End: 2023-06-29

## 2023-06-29 ENCOUNTER — APPOINTMENT (OUTPATIENT)
Age: 53
End: 2023-06-29

## 2023-06-29 LAB
ANISOCYTOSIS BLD QL: SLIGHT — SIGNIFICANT CHANGE UP
BASOPHILS # BLD AUTO: 0 K/UL — SIGNIFICANT CHANGE UP (ref 0–0.2)
BASOPHILS NFR BLD AUTO: 0 % — SIGNIFICANT CHANGE UP (ref 0–2)
DACRYOCYTES BLD QL SMEAR: SLIGHT — SIGNIFICANT CHANGE UP
EOSINOPHIL # BLD AUTO: 0.05 K/UL — SIGNIFICANT CHANGE UP (ref 0–0.5)
EOSINOPHIL NFR BLD AUTO: 1 % — SIGNIFICANT CHANGE UP (ref 0–6)
HCT VFR BLD CALC: 31.4 % — LOW (ref 34.5–45)
HGB BLD-MCNC: 10.6 G/DL — LOW (ref 11.5–15.5)
LYMPHOCYTES # BLD AUTO: 1.45 K/UL — SIGNIFICANT CHANGE UP (ref 1–3.3)
LYMPHOCYTES # BLD AUTO: 30 % — SIGNIFICANT CHANGE UP (ref 13–44)
MCHC RBC-ENTMCNC: 31.5 PG — SIGNIFICANT CHANGE UP (ref 27–34)
MCHC RBC-ENTMCNC: 33.8 G/DL — SIGNIFICANT CHANGE UP (ref 32–36)
MCV RBC AUTO: 93.5 FL — SIGNIFICANT CHANGE UP (ref 80–100)
MONOCYTES # BLD AUTO: 0.82 K/UL — SIGNIFICANT CHANGE UP (ref 0–0.9)
MONOCYTES NFR BLD AUTO: 17 % — HIGH (ref 2–14)
MYELOCYTES NFR BLD: 1 % — HIGH (ref 0–0)
NEUTROPHILS # BLD AUTO: 2.46 K/UL — SIGNIFICANT CHANGE UP (ref 1.8–7.4)
NEUTROPHILS NFR BLD AUTO: 51 % — SIGNIFICANT CHANGE UP (ref 43–77)
NRBC # BLD: 1 /100 — HIGH (ref 0–0)
NRBC # BLD: SIGNIFICANT CHANGE UP /100 WBCS (ref 0–0)
PLAT MORPH BLD: NORMAL — SIGNIFICANT CHANGE UP
PLATELET # BLD AUTO: 212 K/UL — SIGNIFICANT CHANGE UP (ref 150–400)
POIKILOCYTOSIS BLD QL AUTO: SLIGHT — SIGNIFICANT CHANGE UP
POLYCHROMASIA BLD QL SMEAR: SLIGHT — SIGNIFICANT CHANGE UP
RBC # BLD: 3.36 M/UL — LOW (ref 3.8–5.2)
RBC # FLD: 16.5 % — HIGH (ref 10.3–14.5)
RBC BLD AUTO: ABNORMAL
WBC # BLD: 4.83 K/UL — SIGNIFICANT CHANGE UP (ref 3.8–10.5)
WBC # FLD AUTO: 4.83 K/UL — SIGNIFICANT CHANGE UP (ref 3.8–10.5)

## 2023-07-19 ENCOUNTER — APPOINTMENT (OUTPATIENT)
Age: 53
End: 2023-07-19

## 2023-07-19 ENCOUNTER — RESULT REVIEW (OUTPATIENT)
Age: 53
End: 2023-07-19

## 2023-07-19 LAB
BASOPHILS # BLD AUTO: 0.04 K/UL — SIGNIFICANT CHANGE UP (ref 0–0.2)
BASOPHILS NFR BLD AUTO: 0.7 % — SIGNIFICANT CHANGE UP (ref 0–2)
EOSINOPHIL # BLD AUTO: 0 K/UL — SIGNIFICANT CHANGE UP (ref 0–0.5)
EOSINOPHIL NFR BLD AUTO: 0 % — SIGNIFICANT CHANGE UP (ref 0–6)
HCT VFR BLD CALC: 32.7 % — LOW (ref 34.5–45)
HGB BLD-MCNC: 10.8 G/DL — LOW (ref 11.5–15.5)
IMM GRANULOCYTES NFR BLD AUTO: 4 % — HIGH (ref 0–0.9)
LYMPHOCYTES # BLD AUTO: 0.68 K/UL — LOW (ref 1–3.3)
LYMPHOCYTES # BLD AUTO: 11.4 % — LOW (ref 13–44)
MCHC RBC-ENTMCNC: 30.9 PG — SIGNIFICANT CHANGE UP (ref 27–34)
MCHC RBC-ENTMCNC: 33 G/DL — SIGNIFICANT CHANGE UP (ref 32–36)
MCV RBC AUTO: 93.7 FL — SIGNIFICANT CHANGE UP (ref 80–100)
MONOCYTES # BLD AUTO: 0.35 K/UL — SIGNIFICANT CHANGE UP (ref 0–0.9)
MONOCYTES NFR BLD AUTO: 5.8 % — SIGNIFICANT CHANGE UP (ref 2–14)
NEUTROPHILS # BLD AUTO: 4.68 K/UL — SIGNIFICANT CHANGE UP (ref 1.8–7.4)
NEUTROPHILS NFR BLD AUTO: 78.1 % — HIGH (ref 43–77)
NRBC # BLD: 0 /100 WBCS — SIGNIFICANT CHANGE UP (ref 0–0)
PLATELET # BLD AUTO: 189 K/UL — SIGNIFICANT CHANGE UP (ref 150–400)
RBC # BLD: 3.49 M/UL — LOW (ref 3.8–5.2)
RBC # FLD: 16.1 % — HIGH (ref 10.3–14.5)
WBC # BLD: 5.99 K/UL — SIGNIFICANT CHANGE UP (ref 3.8–10.5)
WBC # FLD AUTO: 5.99 K/UL — SIGNIFICANT CHANGE UP (ref 3.8–10.5)

## 2023-07-27 ENCOUNTER — APPOINTMENT (OUTPATIENT)
Dept: SURGICAL ONCOLOGY | Facility: CLINIC | Age: 53
End: 2023-07-27
Payer: COMMERCIAL

## 2023-07-27 PROCEDURE — 99215 OFFICE O/P EST HI 40 MIN: CPT

## 2023-07-30 ENCOUNTER — EMERGENCY (EMERGENCY)
Facility: HOSPITAL | Age: 53
LOS: 1 days | Discharge: HOME | End: 2023-07-30
Attending: EMERGENCY MEDICINE | Admitting: EMERGENCY MEDICINE
Payer: COMMERCIAL

## 2023-07-30 VITALS
HEIGHT: 64 IN | HEART RATE: 120 BPM | RESPIRATION RATE: 18 BRPM | WEIGHT: 179.9 LBS | TEMPERATURE: 99 F | OXYGEN SATURATION: 100 % | DIASTOLIC BLOOD PRESSURE: 106 MMHG | SYSTOLIC BLOOD PRESSURE: 176 MMHG

## 2023-07-30 DIAGNOSIS — Z98.890 OTHER SPECIFIED POSTPROCEDURAL STATES: Chronic | ICD-10-CM

## 2023-07-30 LAB
ALBUMIN SERPL ELPH-MCNC: 4.2 G/DL — SIGNIFICANT CHANGE UP (ref 3.3–5)
ALP SERPL-CCNC: 86 U/L — SIGNIFICANT CHANGE UP (ref 40–120)
ALT FLD-CCNC: 25 U/L — SIGNIFICANT CHANGE UP (ref 4–33)
ANION GAP SERPL CALC-SCNC: 19 MMOL/L — HIGH (ref 7–14)
AST SERPL-CCNC: 31 U/L — SIGNIFICANT CHANGE UP (ref 4–32)
B PERT DNA SPEC QL NAA+PROBE: SIGNIFICANT CHANGE UP
B PERT+PARAPERT DNA PNL SPEC NAA+PROBE: SIGNIFICANT CHANGE UP
BASE EXCESS BLDV CALC-SCNC: -0.1 MMOL/L — SIGNIFICANT CHANGE UP (ref -2–3)
BASE EXCESS BLDV CALC-SCNC: -0.3 MMOL/L — SIGNIFICANT CHANGE UP (ref -2–3)
BASOPHILS # BLD AUTO: 0 K/UL — SIGNIFICANT CHANGE UP (ref 0–0.2)
BASOPHILS NFR BLD AUTO: 0 % — SIGNIFICANT CHANGE UP (ref 0–2)
BILIRUB SERPL-MCNC: 0.3 MG/DL — SIGNIFICANT CHANGE UP (ref 0.2–1.2)
BLOOD GAS VENOUS COMPREHENSIVE RESULT: SIGNIFICANT CHANGE UP
BLOOD GAS VENOUS COMPREHENSIVE RESULT: SIGNIFICANT CHANGE UP
BORDETELLA PARAPERTUSSIS (RAPRVP): SIGNIFICANT CHANGE UP
BUN SERPL-MCNC: 12 MG/DL — SIGNIFICANT CHANGE UP (ref 7–23)
C PNEUM DNA SPEC QL NAA+PROBE: SIGNIFICANT CHANGE UP
CALCIUM SERPL-MCNC: 9.7 MG/DL — SIGNIFICANT CHANGE UP (ref 8.4–10.5)
CHLORIDE BLDV-SCNC: 103 MMOL/L — SIGNIFICANT CHANGE UP (ref 96–108)
CHLORIDE BLDV-SCNC: 106 MMOL/L — SIGNIFICANT CHANGE UP (ref 96–108)
CHLORIDE SERPL-SCNC: 101 MMOL/L — SIGNIFICANT CHANGE UP (ref 98–107)
CO2 BLDV-SCNC: 24.6 MMOL/L — SIGNIFICANT CHANGE UP (ref 22–26)
CO2 BLDV-SCNC: 26.8 MMOL/L — HIGH (ref 22–26)
CO2 SERPL-SCNC: 17 MMOL/L — LOW (ref 22–31)
CREAT SERPL-MCNC: 0.9 MG/DL — SIGNIFICANT CHANGE UP (ref 0.5–1.3)
EGFR: 78 ML/MIN/1.73M2 — SIGNIFICANT CHANGE UP
EOSINOPHIL # BLD AUTO: 0.28 K/UL — SIGNIFICANT CHANGE UP (ref 0–0.5)
EOSINOPHIL NFR BLD AUTO: 3.5 % — SIGNIFICANT CHANGE UP (ref 0–6)
FLUAV SUBTYP SPEC NAA+PROBE: SIGNIFICANT CHANGE UP
FLUBV RNA SPEC QL NAA+PROBE: SIGNIFICANT CHANGE UP
GAS PNL BLDV: 136 MMOL/L — SIGNIFICANT CHANGE UP (ref 136–145)
GAS PNL BLDV: 139 MMOL/L — SIGNIFICANT CHANGE UP (ref 136–145)
GLUCOSE BLDV-MCNC: 103 MG/DL — HIGH (ref 70–99)
GLUCOSE BLDV-MCNC: 106 MG/DL — HIGH (ref 70–99)
GLUCOSE SERPL-MCNC: 111 MG/DL — HIGH (ref 70–99)
HADV DNA SPEC QL NAA+PROBE: SIGNIFICANT CHANGE UP
HCG SERPL-ACNC: <1 MIU/ML — SIGNIFICANT CHANGE UP
HCO3 BLDV-SCNC: 24 MMOL/L — SIGNIFICANT CHANGE UP (ref 22–29)
HCO3 BLDV-SCNC: 25 MMOL/L — SIGNIFICANT CHANGE UP (ref 22–29)
HCOV 229E RNA SPEC QL NAA+PROBE: SIGNIFICANT CHANGE UP
HCOV HKU1 RNA SPEC QL NAA+PROBE: SIGNIFICANT CHANGE UP
HCOV NL63 RNA SPEC QL NAA+PROBE: SIGNIFICANT CHANGE UP
HCOV OC43 RNA SPEC QL NAA+PROBE: SIGNIFICANT CHANGE UP
HCT VFR BLD CALC: 29.5 % — LOW (ref 34.5–45)
HCT VFR BLDA CALC: 29 % — LOW (ref 34.5–46.5)
HCT VFR BLDA CALC: 32 % — LOW (ref 34.5–46.5)
HGB BLD CALC-MCNC: 10.7 G/DL — LOW (ref 11.7–16.1)
HGB BLD CALC-MCNC: 9.5 G/DL — LOW (ref 11.7–16.1)
HGB BLD-MCNC: 9.5 G/DL — LOW (ref 11.5–15.5)
HMPV RNA SPEC QL NAA+PROBE: SIGNIFICANT CHANGE UP
HPIV1 RNA SPEC QL NAA+PROBE: SIGNIFICANT CHANGE UP
HPIV2 RNA SPEC QL NAA+PROBE: SIGNIFICANT CHANGE UP
HPIV3 RNA SPEC QL NAA+PROBE: SIGNIFICANT CHANGE UP
HPIV4 RNA SPEC QL NAA+PROBE: SIGNIFICANT CHANGE UP
IANC: 4.72 K/UL — SIGNIFICANT CHANGE UP (ref 1.8–7.4)
LACTATE BLDV-MCNC: 1.1 MMOL/L — SIGNIFICANT CHANGE UP (ref 0.5–2)
LACTATE BLDV-MCNC: 1.3 MMOL/L — SIGNIFICANT CHANGE UP (ref 0.5–2)
LIDOCAIN IGE QN: 32 U/L — SIGNIFICANT CHANGE UP (ref 7–60)
LYMPHOCYTES # BLD AUTO: 0.57 K/UL — LOW (ref 1–3.3)
LYMPHOCYTES # BLD AUTO: 7.1 % — LOW (ref 13–44)
M PNEUMO DNA SPEC QL NAA+PROBE: SIGNIFICANT CHANGE UP
MACROCYTES BLD QL: SIGNIFICANT CHANGE UP
MAGNESIUM SERPL-MCNC: 1.9 MG/DL — SIGNIFICANT CHANGE UP (ref 1.6–2.6)
MANUAL SMEAR VERIFICATION: SIGNIFICANT CHANGE UP
MCHC RBC-ENTMCNC: 30.7 PG — SIGNIFICANT CHANGE UP (ref 27–34)
MCHC RBC-ENTMCNC: 32.2 GM/DL — SIGNIFICANT CHANGE UP (ref 32–36)
MCV RBC AUTO: 95.5 FL — SIGNIFICANT CHANGE UP (ref 80–100)
MICROCYTES BLD QL: SLIGHT — SIGNIFICANT CHANGE UP
MONOCYTES # BLD AUTO: 1.65 K/UL — HIGH (ref 0–0.9)
MONOCYTES NFR BLD AUTO: 20.4 % — HIGH (ref 2–14)
NEUTROPHILS # BLD AUTO: 5.15 K/UL — SIGNIFICANT CHANGE UP (ref 1.8–7.4)
NEUTROPHILS NFR BLD AUTO: 62.8 % — SIGNIFICANT CHANGE UP (ref 43–77)
NEUTS BAND # BLD: 0.9 % — SIGNIFICANT CHANGE UP (ref 0–6)
OVALOCYTES BLD QL SMEAR: SLIGHT — SIGNIFICANT CHANGE UP
PCO2 BLDV: 34 MMHG — LOW (ref 39–52)
PCO2 BLDV: 45 MMHG — SIGNIFICANT CHANGE UP (ref 39–52)
PH BLDV: 7.36 — SIGNIFICANT CHANGE UP (ref 7.32–7.43)
PH BLDV: 7.45 — HIGH (ref 7.32–7.43)
PHOSPHATE SERPL-MCNC: 4.7 MG/DL — HIGH (ref 2.5–4.5)
PLAT MORPH BLD: NORMAL — SIGNIFICANT CHANGE UP
PLATELET # BLD AUTO: 250 K/UL — SIGNIFICANT CHANGE UP (ref 150–400)
PLATELET COUNT - ESTIMATE: NORMAL — SIGNIFICANT CHANGE UP
PO2 BLDV: 145 MMHG — HIGH (ref 25–45)
PO2 BLDV: 47 MMHG — HIGH (ref 25–45)
POLYCHROMASIA BLD QL SMEAR: SLIGHT — SIGNIFICANT CHANGE UP
POTASSIUM BLDV-SCNC: 3.7 MMOL/L — SIGNIFICANT CHANGE UP (ref 3.5–5.1)
POTASSIUM BLDV-SCNC: 4.6 MMOL/L — SIGNIFICANT CHANGE UP (ref 3.5–5.1)
POTASSIUM SERPL-MCNC: 4.2 MMOL/L — SIGNIFICANT CHANGE UP (ref 3.5–5.3)
POTASSIUM SERPL-SCNC: 4.2 MMOL/L — SIGNIFICANT CHANGE UP (ref 3.5–5.3)
PROT SERPL-MCNC: 7.7 G/DL — SIGNIFICANT CHANGE UP (ref 6–8.3)
RAPID RVP RESULT: SIGNIFICANT CHANGE UP
RBC # BLD: 3.09 M/UL — LOW (ref 3.8–5.2)
RBC # FLD: 15 % — HIGH (ref 10.3–14.5)
RBC BLD AUTO: NORMAL — SIGNIFICANT CHANGE UP
RSV RNA SPEC QL NAA+PROBE: SIGNIFICANT CHANGE UP
RV+EV RNA SPEC QL NAA+PROBE: SIGNIFICANT CHANGE UP
SAO2 % BLDV: 76.6 % — SIGNIFICANT CHANGE UP (ref 67–88)
SAO2 % BLDV: 98.9 % — HIGH (ref 67–88)
SARS-COV-2 RNA SPEC QL NAA+PROBE: SIGNIFICANT CHANGE UP
SODIUM SERPL-SCNC: 137 MMOL/L — SIGNIFICANT CHANGE UP (ref 135–145)
VARIANT LYMPHS # BLD: 5.3 % — SIGNIFICANT CHANGE UP (ref 0–6)
WBC # BLD: 8.09 K/UL — SIGNIFICANT CHANGE UP (ref 3.8–10.5)
WBC # FLD AUTO: 8.09 K/UL — SIGNIFICANT CHANGE UP (ref 3.8–10.5)

## 2023-07-30 PROCEDURE — 93010 ELECTROCARDIOGRAM REPORT: CPT

## 2023-07-30 PROCEDURE — 99285 EMERGENCY DEPT VISIT HI MDM: CPT

## 2023-07-30 PROCEDURE — 71045 X-RAY EXAM CHEST 1 VIEW: CPT | Mod: 26

## 2023-07-30 RX ORDER — SODIUM CHLORIDE 9 MG/ML
1000 INJECTION, SOLUTION INTRAVENOUS ONCE
Refills: 0 | Status: COMPLETED | OUTPATIENT
Start: 2023-07-30 | End: 2023-07-30

## 2023-07-30 RX ADMIN — SODIUM CHLORIDE 1000 MILLILITER(S): 9 INJECTION, SOLUTION INTRAVENOUS at 19:58

## 2023-07-30 NOTE — ED PROVIDER NOTE - ATTENDING CONTRIBUTION TO CARE
52F h/o R br CA on chemo, HTN p/w fever (?42C - seems like error) a/w sore throat, cough no CP/SOB.  1 ep of diarrhea, no abd pain or dysuria.  Tachycardic and LG temp here.  No cough.  Took tylenol PTA as well as ibuprofen.  Plan check labs, cultures, CXR.  Throat mildly red no tonsillar swelling or exudate.  Well appearing.  Rx fluids. Likely viral syndrome.  If tests acceptable here can d/c home f/u H/O as outpt.   VS:  unremarkable except tachycardia improving    GEN - NAD;   malaise;   A+O x3   HEAD - NC/AT     ENT - PEERL, EOMI, mucous membranes   dry, no discharge      NECK: Neck supple, non-tender without lymphadenopathy, no masses, no JVD  PULM - CTA b/l,  symmetric breath sounds  COR -  normal heart sounds    ABD - , ND, NT, soft,  BACK - no CVA tenderness, nontender spine     EXTREMS - no edema, no deformity, warm and well perfused    SKIN - no rash    or bruising      NEUROLOGIC - alert, face symmetric, speech fluent, sensation nl, motor no focal deficit.

## 2023-07-30 NOTE — ED PROVIDER NOTE - PATIENT PORTAL LINK FT
You can access the FollowMyHealth Patient Portal offered by [facility] by registering at the following website: [email or web address]. By joining [facility]’s FollowMyHealth portal, you will also be able to view your health information using other applications (apps) compatible with our system.

## 2023-07-30 NOTE — ED ADULT NURSE NOTE - OBJECTIVE STATEMENT
pt received spot 9. pt A+Ox3, with hx of breast ca on chemo, c/o cough, sore throat and fever and diarrhea today. respirations even and unlabored. states she took tylenol prior to arrival. vss. labs sent. IVSL in place. LR infusing. will monitor.

## 2023-07-30 NOTE — ED ADULT TRIAGE NOTE - CHIEF COMPLAINT QUOTE
was told to come to ED by oncologist. Pt c/o fever, diarrhea, body aches, cough x 2 days. Took tylenol prior to arrival. History of R Breast CA on chemo.

## 2023-07-30 NOTE — ED ADULT NURSE NOTE - NSFALLUNIVINTERV_ED_ALL_ED
Bed/Stretcher in lowest position, wheels locked, appropriate side rails in place/Call bell, personal items and telephone in reach/Instruct patient to call for assistance before getting out of bed/chair/stretcher/Non-slip footwear applied when patient is off stretcher/[place] to call system/Physically safe environment - no spills, clutter or unnecessary equipment/Purposeful proactive rounding/Room/bathroom lighting operational, light cord in reach

## 2023-07-30 NOTE — ED PROVIDER NOTE - NS ED ROS FT
GENERAL: No fever, no chills  	EYES: No change in vision  	HEENT: No trouble swallowing or speaking  	CARDIAC: No chest pain  	PULMONARY: + cough, no SOB  	GI: No abdominal pain, no nausea, no vomiting, no diarrhea, no constipation  	: No changes in urination  	SKIN: No rashes  	NEURO: No headache, no numbness  	MSK: No visible bony deformity   Otherwise as HPI or negative.

## 2023-07-30 NOTE — ED PROVIDER NOTE - OBJECTIVE STATEMENT
This is a 52 year old female with pmh of R breast cancer on chemo (last chemo session 07/19), HTN presenting with measured fever at home > 38C, associated with sore throat, cough, and generalized malaise. Denies any chest pain, shortness of breath, abdominal pain, urinary sxs. Had one episode of diarrhea, however no nausea or vomiting. Has taken Tylenol and ibuprofen with some relief. Reports she feels better than the morning.

## 2023-07-30 NOTE — ED PROVIDER NOTE - PHYSICAL EXAMINATION
General: NAD  HEENT: NCAT. mildly erythematous, non swollen tonsils. No exudates.  Cardiac: RRR, 2+ radial pulses  Chest: CTA  Abdomen: soft, non-distended, no ttp, no rebound or guarding  Extremities: no peripheral edema, calf tenderness, or leg size discrepancies  Skin: no rashes  Neuro: AAOx3, motor and sensory grossly intact.   Psych: mood and affect appropriate

## 2023-07-30 NOTE — ED PROVIDER NOTE - PROGRESS NOTE DETAILS
Ivan Bolden, PGY2 - Labs unremarkable. Urinalysis negative. RVP unremarkable, however most likely consistent with upper respiratory illness. will dc with return precautions.

## 2023-07-30 NOTE — ED PROVIDER NOTE - CLINICAL SUMMARY MEDICAL DECISION MAKING FREE TEXT BOX
This is a 52 year old female with pmh of R breast cancer on chemo (last chemo session 07/19), HTN presenting with measured fever at home > 38C, associated with sore throat, cough, and generalized malaise. Vitals show borderline fever 37.2C, however patient took Tylenol 2 hours ago. Sinus tachycardia to 110-120. Patient well appearing. Unremarkable physical exam. Will work up for sepsis however source most likely viral. Will obtain rvp. If patient feels better after fluid bolus and treatment, and labs and imaging unremarkable, dispo most likely home with outpatient pcp f/u.

## 2023-07-31 VITALS
RESPIRATION RATE: 18 BRPM | SYSTOLIC BLOOD PRESSURE: 140 MMHG | TEMPERATURE: 98 F | HEART RATE: 95 BPM | DIASTOLIC BLOOD PRESSURE: 99 MMHG | OXYGEN SATURATION: 100 %

## 2023-07-31 LAB
APPEARANCE UR: CLEAR — SIGNIFICANT CHANGE UP
BACTERIA # UR AUTO: NEGATIVE /HPF — SIGNIFICANT CHANGE UP
BILIRUB UR-MCNC: NEGATIVE — SIGNIFICANT CHANGE UP
CAST: 0 /LPF — SIGNIFICANT CHANGE UP (ref 0–4)
COLOR SPEC: YELLOW — SIGNIFICANT CHANGE UP
DIFF PNL FLD: NEGATIVE — SIGNIFICANT CHANGE UP
GLUCOSE UR QL: NEGATIVE MG/DL — SIGNIFICANT CHANGE UP
KETONES UR-MCNC: NEGATIVE MG/DL — SIGNIFICANT CHANGE UP
LEUKOCYTE ESTERASE UR-ACNC: ABNORMAL
NITRITE UR-MCNC: NEGATIVE — SIGNIFICANT CHANGE UP
PH UR: 5.5 — SIGNIFICANT CHANGE UP (ref 5–8)
PROT UR-MCNC: NEGATIVE MG/DL — SIGNIFICANT CHANGE UP
RBC CASTS # UR COMP ASSIST: 0 /HPF — SIGNIFICANT CHANGE UP (ref 0–4)
SP GR SPEC: 1.01 — SIGNIFICANT CHANGE UP (ref 1–1.03)
SQUAMOUS # UR AUTO: 2 /HPF — SIGNIFICANT CHANGE UP (ref 0–5)
UROBILINOGEN FLD QL: 0.2 MG/DL — SIGNIFICANT CHANGE UP (ref 0.2–1)
WBC UR QL: 2 /HPF — SIGNIFICANT CHANGE UP (ref 0–5)

## 2023-07-31 NOTE — ED ADULT NURSE REASSESSMENT NOTE - NS ED NURSE REASSESS COMMENT FT1
Break RN: Pt is A&Ox4, ambulatory and resting in stretcher with no complaints at this time. Respirations even and unlabored, chest rise equal b/l. VS as noted in flow sheets. Pt denies chest pain, SOB, fever, cough, chills, abdominal pain, N/V/D, h/a, dizziness, numbness/tingling or any urinary symptoms at this time. No acute distress noted. Safety maintained throughout. Will continue to monitor.
Received report from day RN siddharth. patient received in spot 9. A&O4. Respirations even and unlabored. No signs of acute distress noted. Family at bedside. Comfort and safety maintained. Stretcher in lowest position. Call bell within reach.

## 2023-08-02 LAB
CULTURE RESULTS: SIGNIFICANT CHANGE UP
SPECIMEN SOURCE: SIGNIFICANT CHANGE UP

## 2023-08-03 ENCOUNTER — OUTPATIENT (OUTPATIENT)
Dept: OUTPATIENT SERVICES | Facility: HOSPITAL | Age: 53
LOS: 1 days | Discharge: HOME | End: 2023-08-03

## 2023-08-03 DIAGNOSIS — D64.9 ANEMIA, UNSPECIFIED: ICD-10-CM

## 2023-08-04 LAB
CULTURE RESULTS: SIGNIFICANT CHANGE UP
CULTURE RESULTS: SIGNIFICANT CHANGE UP
SPECIMEN SOURCE: SIGNIFICANT CHANGE UP
SPECIMEN SOURCE: SIGNIFICANT CHANGE UP

## 2023-08-09 ENCOUNTER — APPOINTMENT (OUTPATIENT)
Age: 53
End: 2023-08-09

## 2023-08-09 ENCOUNTER — RESULT REVIEW (OUTPATIENT)
Age: 53
End: 2023-08-09

## 2023-08-09 DIAGNOSIS — R11.2 NAUSEA WITH VOMITING, UNSPECIFIED: ICD-10-CM

## 2023-08-09 DIAGNOSIS — C50.919 MALIGNANT NEOPLASM OF UNSPECIFIED SITE OF UNSPECIFIED FEMALE BREAST: ICD-10-CM

## 2023-08-09 DIAGNOSIS — Z51.11 ENCOUNTER FOR ANTINEOPLASTIC CHEMOTHERAPY: ICD-10-CM

## 2023-08-09 LAB
BASOPHILS # BLD AUTO: 0.03 K/UL — SIGNIFICANT CHANGE UP (ref 0–0.2)
BASOPHILS NFR BLD AUTO: 0.3 % — SIGNIFICANT CHANGE UP (ref 0–2)
EOSINOPHIL # BLD AUTO: 0.01 K/UL — SIGNIFICANT CHANGE UP (ref 0–0.5)
EOSINOPHIL NFR BLD AUTO: 0.1 % — SIGNIFICANT CHANGE UP (ref 0–6)
HCT VFR BLD CALC: 32.5 % — LOW (ref 34.5–45)
HGB BLD-MCNC: 10.9 G/DL — LOW (ref 11.5–15.5)
IMM GRANULOCYTES NFR BLD AUTO: 1.2 % — HIGH (ref 0–0.9)
LYMPHOCYTES # BLD AUTO: 0.94 K/UL — LOW (ref 1–3.3)
LYMPHOCYTES # BLD AUTO: 9.8 % — LOW (ref 13–44)
MCHC RBC-ENTMCNC: 30.3 PG — SIGNIFICANT CHANGE UP (ref 27–34)
MCHC RBC-ENTMCNC: 33.5 G/DL — SIGNIFICANT CHANGE UP (ref 32–36)
MCV RBC AUTO: 90.3 FL — SIGNIFICANT CHANGE UP (ref 80–100)
MONOCYTES # BLD AUTO: 0.1 K/UL — SIGNIFICANT CHANGE UP (ref 0–0.9)
MONOCYTES NFR BLD AUTO: 1 % — LOW (ref 2–14)
NEUTROPHILS # BLD AUTO: 8.36 K/UL — HIGH (ref 1.8–7.4)
NEUTROPHILS NFR BLD AUTO: 87.6 % — HIGH (ref 43–77)
NRBC # BLD: 0 /100 WBCS — SIGNIFICANT CHANGE UP (ref 0–0)
PLATELET # BLD AUTO: 347 K/UL — SIGNIFICANT CHANGE UP (ref 150–400)
RBC # BLD: 3.6 M/UL — LOW (ref 3.8–5.2)
RBC # FLD: 15.6 % — HIGH (ref 10.3–14.5)
WBC # BLD: 9.55 K/UL — SIGNIFICANT CHANGE UP (ref 3.8–10.5)
WBC # FLD AUTO: 9.55 K/UL — SIGNIFICANT CHANGE UP (ref 3.8–10.5)

## 2023-08-30 ENCOUNTER — NON-APPOINTMENT (OUTPATIENT)
Age: 53
End: 2023-08-30

## 2023-08-30 ENCOUNTER — RESULT REVIEW (OUTPATIENT)
Age: 53
End: 2023-08-30

## 2023-08-30 ENCOUNTER — APPOINTMENT (OUTPATIENT)
Age: 53
End: 2023-08-30

## 2023-08-30 LAB
ALBUMIN SERPL ELPH-MCNC: 4.6 G/DL — SIGNIFICANT CHANGE UP (ref 3.3–5)
ALP SERPL-CCNC: 75 U/L — SIGNIFICANT CHANGE UP (ref 40–120)
ALT FLD-CCNC: 28 U/L — SIGNIFICANT CHANGE UP (ref 10–45)
ANION GAP SERPL CALC-SCNC: 14 MMOL/L — SIGNIFICANT CHANGE UP (ref 5–17)
AST SERPL-CCNC: 35 U/L — SIGNIFICANT CHANGE UP (ref 10–40)
BASOPHILS # BLD AUTO: 0.03 K/UL — SIGNIFICANT CHANGE UP (ref 0–0.2)
BASOPHILS NFR BLD AUTO: 0.3 % — SIGNIFICANT CHANGE UP (ref 0–2)
BILIRUB SERPL-MCNC: 0.2 MG/DL — SIGNIFICANT CHANGE UP (ref 0.2–1.2)
BUN SERPL-MCNC: 13 MG/DL — SIGNIFICANT CHANGE UP (ref 7–23)
CALCIUM SERPL-MCNC: 9.9 MG/DL — SIGNIFICANT CHANGE UP (ref 8.4–10.5)
CHLORIDE SERPL-SCNC: 100 MMOL/L — SIGNIFICANT CHANGE UP (ref 96–108)
CO2 SERPL-SCNC: 23 MMOL/L — SIGNIFICANT CHANGE UP (ref 22–31)
CREAT SERPL-MCNC: 0.75 MG/DL — SIGNIFICANT CHANGE UP (ref 0.5–1.3)
EGFR: 96 ML/MIN/1.73M2 — SIGNIFICANT CHANGE UP
EOSINOPHIL # BLD AUTO: 0 K/UL — SIGNIFICANT CHANGE UP (ref 0–0.5)
EOSINOPHIL NFR BLD AUTO: 0 % — SIGNIFICANT CHANGE UP (ref 0–6)
GLUCOSE SERPL-MCNC: 203 MG/DL — HIGH (ref 70–99)
HCT VFR BLD CALC: 33.4 % — LOW (ref 34.5–45)
HGB BLD-MCNC: 11.1 G/DL — LOW (ref 11.5–15.5)
IMM GRANULOCYTES NFR BLD AUTO: 1.3 % — HIGH (ref 0–0.9)
LYMPHOCYTES # BLD AUTO: 0.97 K/UL — LOW (ref 1–3.3)
LYMPHOCYTES # BLD AUTO: 11.1 % — LOW (ref 13–44)
MAGNESIUM SERPL-MCNC: 2 MG/DL — SIGNIFICANT CHANGE UP (ref 1.6–2.6)
MCHC RBC-ENTMCNC: 29.6 PG — SIGNIFICANT CHANGE UP (ref 27–34)
MCHC RBC-ENTMCNC: 33.2 G/DL — SIGNIFICANT CHANGE UP (ref 32–36)
MCV RBC AUTO: 89.1 FL — SIGNIFICANT CHANGE UP (ref 80–100)
MONOCYTES # BLD AUTO: 0.09 K/UL — SIGNIFICANT CHANGE UP (ref 0–0.9)
MONOCYTES NFR BLD AUTO: 1 % — LOW (ref 2–14)
NEUTROPHILS # BLD AUTO: 7.5 K/UL — HIGH (ref 1.8–7.4)
NEUTROPHILS NFR BLD AUTO: 86.3 % — HIGH (ref 43–77)
NRBC # BLD: 0 /100 WBCS — SIGNIFICANT CHANGE UP (ref 0–0)
PLATELET # BLD AUTO: 296 K/UL — SIGNIFICANT CHANGE UP (ref 150–400)
POTASSIUM SERPL-MCNC: 4 MMOL/L — SIGNIFICANT CHANGE UP (ref 3.5–5.3)
POTASSIUM SERPL-SCNC: 4 MMOL/L — SIGNIFICANT CHANGE UP (ref 3.5–5.3)
PROT SERPL-MCNC: 8.1 G/DL — SIGNIFICANT CHANGE UP (ref 6–8.3)
RBC # BLD: 3.75 M/UL — LOW (ref 3.8–5.2)
RBC # FLD: 15.4 % — HIGH (ref 10.3–14.5)
SODIUM SERPL-SCNC: 137 MMOL/L — SIGNIFICANT CHANGE UP (ref 135–145)
WBC # BLD: 8.7 K/UL — SIGNIFICANT CHANGE UP (ref 3.8–10.5)
WBC # FLD AUTO: 8.7 K/UL — SIGNIFICANT CHANGE UP (ref 3.8–10.5)

## 2023-09-07 ENCOUNTER — APPOINTMENT (OUTPATIENT)
Dept: HEMATOLOGY ONCOLOGY | Facility: CLINIC | Age: 53
End: 2023-09-07
Payer: COMMERCIAL

## 2023-09-07 PROCEDURE — 99215 OFFICE O/P EST HI 40 MIN: CPT | Mod: 95

## 2023-09-08 ENCOUNTER — TRANSCRIPTION ENCOUNTER (OUTPATIENT)
Age: 53
End: 2023-09-08

## 2023-09-08 NOTE — HISTORY OF PRESENT ILLNESS
[Disease: _____________________] : Disease: [unfilled] [T: ___] : T[unfilled] [N: ___] : N[unfilled] [M: ___] : M[unfilled] [AJCC Stage: ____] : AJCC Stage: [unfilled] [de-identified] : 52F, -American, premenopausal, PMHx HTN, OA, s/p knee surgery, referred for medical oncology consultation of right breast infiltrating ductal carcinoma diagnosed February 2023.   CASE SYNOPSIS:  12/2/2022 routine screening mammogram ([facility])  1.  Right 10:00, 4-5 cm from the nipple focal asymmetry and microcalcifications for which diagnostic mammogram/sonogram recommended  2.  The right 10:00, 5-6 cm from the nipple mass for which diagnostic mammogram and targeted sonography is recommended.  3.  Left 1:00 calcifications for which diagnostic mammography recommended.  Bilateral sonography should be considered given dense breast parenchyma.   2/2/2023 US guided core needle biopsy right breast ([facility]) 2.5 cm mass 10:00, N 7 cm; pathology infiltrating ductal carcinoma, DCIS, ER 90%, AK 90% HER2 equivocal, negative by CISH   2/11/2023 breast MRI ([facility]): Recently diagnosed right breast malignancy characterized by 2.9 cm mass with adjacent satellite lesions.  6 mm enhancing mass in the left inner breast for which targeted US and US guided core needle biopsy recommended.  Prominent lymph nodes in the right axilla for which targeted US evaluation recommended with possible US guided core needle biopsy.   2/22/23: US guided core biopsy of :  1.right axilla 10:00, N11 cm  - reactive LN, negative for metastatic carcinoma.  2.left breast 9:00, N 2 cm-fibroadenomatoid nodule; pseudo-angiomatous stromal hyperplasia   3/2/2023: s/p 2 Mag seed localization   3/6/2023 right breast lumpectomy with SLNB (Dr. Max Love); pathology invasive moderately differentiated ductal carcinoma 3.5 cm, ER>90%, AK> 90%, Her 2 equivocal, CISH negative;  DCIS solid and cribriform types with intermediate to high nuclear grade, focal necrosis and microcalcifications, metastatic carcinoma involving 1 lymph node (9 mm), LVI identified.  Right inferior margin DCIS <0.5 mm from inked margin (pT2,p N1a)   3/16/23 CT C/A/P: No gross evidence for metastatic disease; Few nonspecific micronodules in the lungs and mild atelectasis. 3 month follow up is recommended.  3/31/2023: Oncotype DX 27 (distant recurrence risk at 9 years 21%).  4/21/2023 transthoracic echocardiogram: LVEF 68%  5/5/2023 C1 D1 Adriamycin/Cytoxan   [de-identified] : Moderately differentiated invasive ductal carcinoma [de-identified] : ER>90%, IA> 90%, Her 2 equivocal, CISH negative [FreeTextEntry1] : ACT x 4 [de-identified] : Telehealth visit . Received C3 D1 paclitaxel 170 mg/m on 8/30/2023.  Experiencing peripheral sensory neuropathy 10/10 intensity.  Started on gabapentin 100 mg TID with partial symptom control.  Appears nontoxic.  Complains of intermittent constipation.  Hematologic profile remains stable with hemoglobin at 11 g/dL, normal WBC and platelets.  No hospitalization.  She is currently on disability.  Her last dose of paclitaxel scheduled in 2 weeks, on 9/20/23.

## 2023-09-08 NOTE — PHYSICAL EXAM
[Ambulatory and capable of all self care but unable to carry out any work activities] : Status 2- Ambulatory and capable of all self care but unable to carry out any work activities. Up and about more than 50% of waking hours [Normal] : normal appearance, no rash, nodules, vesicles, ulcers, erythema [de-identified] : S/p right breast lumpectomy; horizontal scar well-healed.  On the and the scar breast swelling?  seroma

## 2023-09-08 NOTE — REVIEW OF SYSTEMS
[de-identified] : Peripheral sensory neuropathy [Fatigue] : fatigue [Negative] : Endocrine [FreeTextEntry2] : + Alopecia

## 2023-09-08 NOTE — REVIEW OF SYSTEMS
[de-identified] : Peripheral sensory neuropathy [Fatigue] : fatigue [Negative] : Endocrine [FreeTextEntry2] : + Alopecia

## 2023-09-08 NOTE — PHYSICAL EXAM
[Ambulatory and capable of all self care but unable to carry out any work activities] : Status 2- Ambulatory and capable of all self care but unable to carry out any work activities. Up and about more than 50% of waking hours [Normal] : normal appearance, no rash, nodules, vesicles, ulcers, erythema [de-identified] : S/p right breast lumpectomy; horizontal scar well-healed.  On the and the scar breast swelling?  seroma

## 2023-09-08 NOTE — HISTORY OF PRESENT ILLNESS
[Disease: _____________________] : Disease: [unfilled] [T: ___] : T[unfilled] [N: ___] : N[unfilled] [M: ___] : M[unfilled] [AJCC Stage: ____] : AJCC Stage: [unfilled] [de-identified] : 52F, -American, premenopausal, PMHx HTN, OA, s/p knee surgery, referred for medical oncology consultation of right breast infiltrating ductal carcinoma diagnosed February 2023.   CASE SYNOPSIS:  12/2/2022 routine screening mammogram ([facility] radiology)  1.  Right 10:00, 4-5 cm from the nipple focal asymmetry and microcalcifications for which diagnostic mammogram/sonogram recommended  2.  The right 10:00, 5-6 cm from the nipple mass for which diagnostic mammogram and targeted sonography is recommended.  3.  Left 1:00 calcifications for which diagnostic mammography recommended.  Bilateral sonography should be considered given dense breast parenchyma.   2/2/2023 US guided core needle biopsy right breast ([facility] radiology) 2.5 cm mass 10:00, N 7 cm; pathology infiltrating ductal carcinoma, DCIS, ER 90%, OH 90% HER2 equivocal, negative by CISH   2/11/2023 breast MRI ([facility]): Recently diagnosed right breast malignancy characterized by 2.9 cm mass with adjacent satellite lesions.  6 mm enhancing mass in the left inner breast for which targeted US and US guided core needle biopsy recommended.  Prominent lymph nodes in the right axilla for which targeted US evaluation recommended with possible US guided core needle biopsy.   2/22/23: US guided core biopsy of :  1.right axilla 10:00, N11 cm  - reactive LN, negative for metastatic carcinoma.  2.left breast 9:00, N 2 cm-fibroadenomatoid nodule; pseudo-angiomatous stromal hyperplasia   3/2/2023: s/p 2 Mag seed localization   3/6/2023 right breast lumpectomy with SLNB (Dr. Max Love); pathology invasive moderately differentiated ductal carcinoma 3.5 cm, ER>90%, OH> 90%, Her 2 equivocal, CISH negative;  DCIS solid and cribriform types with intermediate to high nuclear grade, focal necrosis and microcalcifications, metastatic carcinoma involving 1 lymph node (9 mm), LVI identified.  Right inferior margin DCIS <0.5 mm from inked margin (pT2,p N1a)   3/16/23 CT C/A/P: No gross evidence for metastatic disease; Few nonspecific micronodules in the lungs and mild atelectasis. 3 month follow up is recommended.  3/31/2023: Oncotype DX 27 (distant recurrence risk at 9 years 21%).  4/21/2023 transthoracic echocardiogram: LVEF 68%  5/5/2023 C1 D1 Adriamycin/Cytoxan   [de-identified] : Moderately differentiated invasive ductal carcinoma [FreeTextEntry1] : ACT x 4 [de-identified] : ER>90%, MT> 90%, Her 2 equivocal, CISH negative [de-identified] : Telehealth visit . Received C3 D1 paclitaxel 170 mg/m on 8/30/2023.  Experiencing peripheral sensory neuropathy 10/10 intensity.  Started on gabapentin 100 mg TID with partial symptom control.  Appears nontoxic.  Complains of intermittent constipation.  Hematologic profile remains stable with hemoglobin at 11 g/dL, normal WBC and platelets.  No hospitalization.  She is currently on disability.  Her last dose of paclitaxel scheduled in 2 weeks, on 9/20/23.

## 2023-09-20 ENCOUNTER — RESULT REVIEW (OUTPATIENT)
Age: 53
End: 2023-09-20

## 2023-09-20 ENCOUNTER — APPOINTMENT (OUTPATIENT)
Dept: INFUSION THERAPY | Facility: HOSPITAL | Age: 53
End: 2023-09-20

## 2023-09-20 ENCOUNTER — APPOINTMENT (OUTPATIENT)
Dept: HEMATOLOGY ONCOLOGY | Facility: CLINIC | Age: 53
End: 2023-09-20

## 2023-09-20 LAB
BASOPHILS # BLD AUTO: 0.01 K/UL — SIGNIFICANT CHANGE UP (ref 0–0.2)
BASOPHILS NFR BLD AUTO: 0.2 % — SIGNIFICANT CHANGE UP (ref 0–2)
EOSINOPHIL # BLD AUTO: 0.01 K/UL — SIGNIFICANT CHANGE UP (ref 0–0.5)
EOSINOPHIL NFR BLD AUTO: 0.2 % — SIGNIFICANT CHANGE UP (ref 0–6)
HCT VFR BLD CALC: 33.3 % — LOW (ref 34.5–45)
HGB BLD-MCNC: 11 G/DL — LOW (ref 11.5–15.5)
IMM GRANULOCYTES NFR BLD AUTO: 1.5 % — HIGH (ref 0–0.9)
LYMPHOCYTES # BLD AUTO: 0.78 K/UL — LOW (ref 1–3.3)
LYMPHOCYTES # BLD AUTO: 12 % — LOW (ref 13–44)
MCHC RBC-ENTMCNC: 29.3 PG — SIGNIFICANT CHANGE UP (ref 27–34)
MCHC RBC-ENTMCNC: 33 G/DL — SIGNIFICANT CHANGE UP (ref 32–36)
MCV RBC AUTO: 88.6 FL — SIGNIFICANT CHANGE UP (ref 80–100)
MONOCYTES # BLD AUTO: 0.06 K/UL — SIGNIFICANT CHANGE UP (ref 0–0.9)
MONOCYTES NFR BLD AUTO: 0.9 % — LOW (ref 2–14)
NEUTROPHILS # BLD AUTO: 5.54 K/UL — SIGNIFICANT CHANGE UP (ref 1.8–7.4)
NEUTROPHILS NFR BLD AUTO: 85.2 % — HIGH (ref 43–77)
NRBC # BLD: 0 /100 WBCS — SIGNIFICANT CHANGE UP (ref 0–0)
PLATELET # BLD AUTO: 217 K/UL — SIGNIFICANT CHANGE UP (ref 150–400)
RBC # BLD: 3.76 M/UL — LOW (ref 3.8–5.2)
RBC # FLD: 16 % — HIGH (ref 10.3–14.5)
WBC # BLD: 6.5 K/UL — SIGNIFICANT CHANGE UP (ref 3.8–10.5)
WBC # FLD AUTO: 6.5 K/UL — SIGNIFICANT CHANGE UP (ref 3.8–10.5)

## 2023-10-17 ENCOUNTER — APPOINTMENT (OUTPATIENT)
Dept: OBGYN | Facility: CLINIC | Age: 53
End: 2023-10-17
Payer: COMMERCIAL

## 2023-10-17 VITALS
WEIGHT: 193 LBS | DIASTOLIC BLOOD PRESSURE: 87 MMHG | HEIGHT: 63 IN | SYSTOLIC BLOOD PRESSURE: 134 MMHG | BODY MASS INDEX: 34.2 KG/M2

## 2023-10-17 VITALS
WEIGHT: 193 LBS | BODY MASS INDEX: 34.2 KG/M2 | HEIGHT: 63 IN | SYSTOLIC BLOOD PRESSURE: 134 MMHG | DIASTOLIC BLOOD PRESSURE: 87 MMHG

## 2023-10-17 LAB
BILIRUB UR QL STRIP: NORMAL
GLUCOSE UR-MCNC: NORMAL
HCG UR QL: 0.2 EU/DL
HGB UR QL STRIP.AUTO: NORMAL
KETONES UR-MCNC: NORMAL
LEUKOCYTE ESTERASE UR QL STRIP: NORMAL
NITRITE UR QL STRIP: NORMAL
PH UR STRIP: 5.5
PROT UR STRIP-MCNC: NORMAL
SP GR UR STRIP: 1.02

## 2023-10-17 PROCEDURE — 81003 URINALYSIS AUTO W/O SCOPE: CPT | Mod: QW

## 2023-10-17 PROCEDURE — 99212 OFFICE O/P EST SF 10 MIN: CPT | Mod: 25

## 2023-10-18 ENCOUNTER — OUTPATIENT (OUTPATIENT)
Dept: OUTPATIENT SERVICES | Facility: HOSPITAL | Age: 53
LOS: 1 days | Discharge: HOME | End: 2023-10-18

## 2023-10-18 DIAGNOSIS — Z98.890 OTHER SPECIFIED POSTPROCEDURAL STATES: Chronic | ICD-10-CM

## 2023-10-18 DIAGNOSIS — D64.9 ANEMIA, UNSPECIFIED: ICD-10-CM

## 2023-10-19 ENCOUNTER — APPOINTMENT (OUTPATIENT)
Dept: HEMATOLOGY ONCOLOGY | Facility: CLINIC | Age: 53
End: 2023-10-19
Payer: COMMERCIAL

## 2023-10-19 ENCOUNTER — NON-APPOINTMENT (OUTPATIENT)
Age: 53
End: 2023-10-19

## 2023-10-19 ENCOUNTER — APPOINTMENT (OUTPATIENT)
Dept: RADIATION ONCOLOGY | Facility: CLINIC | Age: 53
End: 2023-10-19
Payer: COMMERCIAL

## 2023-10-19 ENCOUNTER — OUTPATIENT (OUTPATIENT)
Dept: OUTPATIENT SERVICES | Facility: HOSPITAL | Age: 53
LOS: 1 days | Discharge: HOME | End: 2023-10-19
Payer: COMMERCIAL

## 2023-10-19 VITALS
HEIGHT: 63 IN | SYSTOLIC BLOOD PRESSURE: 137 MMHG | DIASTOLIC BLOOD PRESSURE: 88 MMHG | RESPIRATION RATE: 17 BRPM | TEMPERATURE: 97.7 F | BODY MASS INDEX: 34.2 KG/M2 | OXYGEN SATURATION: 100 % | HEART RATE: 89 BPM | WEIGHT: 193 LBS

## 2023-10-19 DIAGNOSIS — Z98.890 OTHER SPECIFIED POSTPROCEDURAL STATES: Chronic | ICD-10-CM

## 2023-10-19 PROCEDURE — 99215 OFFICE O/P EST HI 40 MIN: CPT

## 2023-10-19 PROCEDURE — 99213 OFFICE O/P EST LOW 20 MIN: CPT | Mod: 25,GC

## 2023-10-19 PROCEDURE — 77263 THER RADIOLOGY TX PLNG CPLX: CPT

## 2023-10-19 RX ORDER — DEXAMETHASONE 4 MG/1
4 TABLET ORAL
Qty: 40 | Refills: 1 | Status: DISCONTINUED | COMMUNITY
Start: 2023-07-19 | End: 2023-10-19

## 2023-10-19 RX ORDER — OXYCODONE 20 MG/1
20 TABLET ORAL
Refills: 0 | Status: DISCONTINUED | COMMUNITY
End: 2023-10-19

## 2023-10-19 RX ORDER — MONTELUKAST 10 MG/1
10 TABLET, FILM COATED ORAL
Refills: 0 | Status: DISCONTINUED | COMMUNITY
End: 2023-10-19

## 2023-10-30 ENCOUNTER — NON-APPOINTMENT (OUTPATIENT)
Age: 53
End: 2023-10-30

## 2023-10-30 PROCEDURE — 77334 RADIATION TREATMENT AID(S): CPT | Mod: 26

## 2023-10-30 PROCEDURE — 77290 THER RAD SIMULAJ FIELD CPLX: CPT | Mod: 26

## 2023-11-10 PROCEDURE — 77300 RADIATION THERAPY DOSE PLAN: CPT | Mod: 26

## 2023-11-10 PROCEDURE — 77295 3-D RADIOTHERAPY PLAN: CPT | Mod: 26

## 2023-11-10 PROCEDURE — 77334 RADIATION TREATMENT AID(S): CPT | Mod: 26

## 2023-11-14 PROCEDURE — 77280 THER RAD SIMULAJ FIELD SMPL: CPT | Mod: 26

## 2023-11-15 ENCOUNTER — NON-APPOINTMENT (OUTPATIENT)
Age: 53
End: 2023-11-15

## 2023-11-15 PROCEDURE — 77387C: CUSTOM

## 2023-11-16 PROCEDURE — 77387C: CUSTOM

## 2023-11-16 NOTE — ASU PREOP CHECKLIST - INTERNAL PROSTHESES
Breast Markers/yes(specify) [place] Pregnancy And Lactation Text: There is insufficient data to evaluate drug-associated risk for major birth defects, miscarriage, or other adverse maternal or fetal outcomes.  There is a pregnancy registry that monitors pregnancy outcomes in pregnant persons exposed to the medication during pregnancy.  It is unknown if this medication is excreted in breast milk.  Do not breastfeed during treatment and for about 4 weeks after the last dose.

## 2023-11-17 PROCEDURE — 77387C: CUSTOM

## 2023-11-19 PROCEDURE — 77387C: CUSTOM

## 2023-11-20 PROCEDURE — 77387C: CUSTOM

## 2023-11-20 PROCEDURE — 77427 RADIATION TX MANAGEMENT X5: CPT

## 2023-11-21 PROCEDURE — 77387C: CUSTOM

## 2023-11-22 ENCOUNTER — NON-APPOINTMENT (OUTPATIENT)
Age: 53
End: 2023-11-22

## 2023-11-22 PROCEDURE — 77387C: CUSTOM

## 2023-11-25 ENCOUNTER — APPOINTMENT (OUTPATIENT)
Dept: INFUSION THERAPY | Facility: HOSPITAL | Age: 53
End: 2023-11-25

## 2023-11-27 DIAGNOSIS — C50.919 MALIGNANT NEOPLASM OF UNSPECIFIED SITE OF UNSPECIFIED FEMALE BREAST: ICD-10-CM

## 2023-11-27 DIAGNOSIS — C50.911 MALIGNANT NEOPLASM OF UNSPECIFIED SITE OF RIGHT FEMALE BREAST: ICD-10-CM

## 2023-11-27 PROCEDURE — 77387C: CUSTOM

## 2023-11-28 PROCEDURE — 77387C: CUSTOM

## 2023-11-29 ENCOUNTER — NON-APPOINTMENT (OUTPATIENT)
Age: 53
End: 2023-11-29

## 2023-11-29 PROCEDURE — 77427 RADIATION TX MANAGEMENT X5: CPT

## 2023-11-29 PROCEDURE — 77387C: CUSTOM

## 2023-11-30 PROCEDURE — 77387C: CUSTOM

## 2023-12-01 PROCEDURE — 77387C: CUSTOM

## 2023-12-04 PROCEDURE — 77387C: CUSTOM

## 2023-12-06 ENCOUNTER — NON-APPOINTMENT (OUTPATIENT)
Age: 53
End: 2023-12-06

## 2023-12-06 DIAGNOSIS — G62.9 POLYNEUROPATHY, UNSPECIFIED: ICD-10-CM

## 2023-12-06 PROCEDURE — 77387C: CUSTOM

## 2023-12-06 PROCEDURE — 77427 RADIATION TX MANAGEMENT X5: CPT

## 2023-12-06 RX ORDER — GABAPENTIN 100 MG/1
100 CAPSULE ORAL 3 TIMES DAILY
Qty: 60 | Refills: 1 | Status: COMPLETED | COMMUNITY
Start: 2023-08-30 | End: 2023-12-06

## 2023-12-06 RX ORDER — GABAPENTIN 300 MG/1
300 CAPSULE ORAL AT BEDTIME
Qty: 90 | Refills: 2 | Status: ACTIVE | COMMUNITY
Start: 2023-12-06 | End: 1900-01-01

## 2023-12-07 PROCEDURE — 77387C: CUSTOM

## 2023-12-08 ENCOUNTER — APPOINTMENT (OUTPATIENT)
Dept: HEMATOLOGY ONCOLOGY | Facility: CLINIC | Age: 53
End: 2023-12-08
Payer: COMMERCIAL

## 2023-12-08 VITALS
DIASTOLIC BLOOD PRESSURE: 84 MMHG | WEIGHT: 196.87 LBS | RESPIRATION RATE: 17 BRPM | TEMPERATURE: 97.7 F | BODY MASS INDEX: 34.88 KG/M2 | OXYGEN SATURATION: 98 % | SYSTOLIC BLOOD PRESSURE: 132 MMHG | HEIGHT: 62.99 IN | HEART RATE: 90 BPM

## 2023-12-08 PROCEDURE — 77387C: CUSTOM

## 2023-12-08 PROCEDURE — 99215 OFFICE O/P EST HI 40 MIN: CPT

## 2023-12-11 PROCEDURE — 77280 THER RAD SIMULAJ FIELD SMPL: CPT | Mod: 26

## 2023-12-11 PROCEDURE — 77387C: CUSTOM

## 2023-12-12 ENCOUNTER — NON-APPOINTMENT (OUTPATIENT)
Age: 53
End: 2023-12-12

## 2023-12-12 PROCEDURE — 77387C: CUSTOM

## 2023-12-13 PROCEDURE — 77387C: CUSTOM

## 2023-12-13 RX ORDER — IBUPROFEN 400 MG/1
400 TABLET, FILM COATED ORAL EVERY 8 HOURS
Qty: 30 | Refills: 2 | Status: ACTIVE | COMMUNITY
Start: 2023-05-02 | End: 1900-01-01

## 2023-12-14 ENCOUNTER — APPOINTMENT (OUTPATIENT)
Dept: OBGYN | Facility: CLINIC | Age: 53
End: 2023-12-14
Payer: COMMERCIAL

## 2023-12-14 VITALS — WEIGHT: 192 LBS | BODY MASS INDEX: 34.02 KG/M2 | SYSTOLIC BLOOD PRESSURE: 136 MMHG | DIASTOLIC BLOOD PRESSURE: 86 MMHG

## 2023-12-14 LAB
BILIRUB UR QL STRIP: NORMAL
GLUCOSE UR-MCNC: NORMAL
HCG UR QL: 0.2 EU/DL
HCG UR QL: NEGATIVE
HGB UR QL STRIP.AUTO: NORMAL
KETONES UR-MCNC: NORMAL
LEUKOCYTE ESTERASE UR QL STRIP: NORMAL
NITRITE UR QL STRIP: NORMAL
PH UR STRIP: 5.5
PROT UR STRIP-MCNC: NORMAL
SP GR UR STRIP: >=1.03

## 2023-12-14 PROCEDURE — 81003 URINALYSIS AUTO W/O SCOPE: CPT | Mod: QW

## 2023-12-14 PROCEDURE — 99212 OFFICE O/P EST SF 10 MIN: CPT | Mod: 25

## 2023-12-14 PROCEDURE — 77387C: CUSTOM

## 2023-12-14 PROCEDURE — 81025 URINE PREGNANCY TEST: CPT

## 2023-12-14 PROCEDURE — 77427 RADIATION TX MANAGEMENT X5: CPT

## 2023-12-14 NOTE — HISTORY OF PRESENT ILLNESS
[FreeTextEntry1] : pt here for f/u regarding scheduling TLH/BS for ER/AR+ breast cancer. heme/onc has finished chemotherapy 9/2023 and last radiation therapy done today. advised pt at least BSO, or hysterectomy as well. pt desires TLH/BSO.  discussed with pt options. she desires to keep cervix at this time. discussed that removal of cervix for TLH would be much less morbid procedure for her as it would not need mini laparotomy for removal of specimen. she is contemplating SAUL vs TLH at this time. will proceed with scheduling hysterectomy BSO for end of january/beginnign of february  on tamoxifen, knows that she needs to stop prior to surgery for VTE risk.

## 2023-12-18 ENCOUNTER — NON-APPOINTMENT (OUTPATIENT)
Age: 53
End: 2023-12-18

## 2023-12-28 ENCOUNTER — OUTPATIENT (OUTPATIENT)
Dept: OUTPATIENT SERVICES | Facility: HOSPITAL | Age: 53
LOS: 1 days | Discharge: HOME | End: 2023-12-28

## 2023-12-28 DIAGNOSIS — C50.911 MALIGNANT NEOPLASM OF UNSPECIFIED SITE OF RIGHT FEMALE BREAST: ICD-10-CM

## 2023-12-28 DIAGNOSIS — Z98.890 OTHER SPECIFIED POSTPROCEDURAL STATES: Chronic | ICD-10-CM

## 2024-01-06 ENCOUNTER — APPOINTMENT (OUTPATIENT)
Dept: INFUSION THERAPY | Facility: HOSPITAL | Age: 54
End: 2024-01-06

## 2024-01-20 NOTE — PHYSICAL EXAM
[de-identified] : Right lumpectomy and axillary scars are well healed, no erythema, no hyperpigmentation.

## 2024-01-20 NOTE — PHYSICAL EXAM
[] : no respiratory distress [de-identified] : Right lumpectomy and axillary scars are well healed, mild to moderate erythema and hyperpigmentation. [de-identified] : in a left foot brace

## 2024-01-20 NOTE — HISTORY OF PRESENT ILLNESS
[FreeTextEntry1] : Ms. Mckinley is a 52 yo jeevan-menopausal woman with stage IIA, V6H1gE2 moderately differentiated invasive ductal carcinoma of the right breast, ER >90%, ME >90% and HER2 2+ CISH non-amplified. She was seen in multi-disciplinary clinic on 3/23/23. She completed a course of AC-T chemotherapy,and presents for re-evaluation.   She was undergoing routine screening mammography. Her last normal mammogram was from March 2021. The next screening mammography on 12/2/22 showed a focal asymmetry with calcifications in the right breast at 10:00, nipple mass, questionable calcifications in the left breast at 1:00.  Diagnostic mammography on 1/20/23 showed a spiculated mass in the right upper outer breast with pleomorphic microcalcifications. Breast ultrasound showed an ill-defined hypoechoic mass in the right breast at 10:00, 7 cm FN, measuring up to 26 mm. A smoothly marginated ovoid hypoechoic solid nodule was located at 10:30 8 cm FN, measuring up to 9 mm. There was no adenopathy.  Right breast biopsy on 2/3/23 showed invasive moderately differentiated ductal carcinoma, Anthony score 6/9, measuring at least 14 mm. DCIS was also present, solid pattern with intermediate nuclear grade atypia, and microcalcifications. There was no lymphvascular invasion. IHC showed ER >90%, ME >90% and HER2 2+ non-amplified by CISH.  Breast MRI on 2/11/23 showed a 2.9 cm enhancing mass in the right upper outer breast with biopsy marker. Several satellite lesions were noted. There was a 6 mm enhancing focus in the left inner retroareolar breast. Several lymph nodes in the right axilla displayed cortical thickening, the largest measuring 8 mm.  Ultrasound guided biopsy of the left breast lesion on 2/22/23 showed benign fibroadenomatoid nodule. Biopsy of the right axillary lymph node showed reactive node and was negative for carcinoma.  She underwent right breast lumpectomy and sentinel lymph node biopsy on 3/6/23 showing invasive moderately differentiated ductal carcinoma measuring 3.5 cm. DCIS was also present, with solid and cribriform patterns, intermediate to high nuclear grade, focal necrosis and calcifications. The largest extent of DCIS measured 25 mm and DCIS was present 15 out of 16 blocks. Lymphvascular invasion was identified. There was a metastatic lymph node within the lumpectomy specimen with a metastatic deposit measuring 9 mm. Right axillary sentinel lymph node biopsy showed one lymph which was positive for metastatic carcinoma measuring 2.2 mm and no extranodal extension. Shaved margins contained mostly fibrocystic change. The inferior shaved margin contained DCIS, solid and cribriform patterns, intermediate to high nuclear grade, and focal necrosis, measuring at least 5 mm, located <0.5 mm from the final margin. Oncotype Dx recurrence score was 27.   Extent of disease work up including CT CAP on 3/16/23 showed no evidence for metastatic disease. There were a few nonspecific micro-nodules in the lungs and mild atelectasis for which three-month follow up was recommended. A bone scan on 3/16/23 showed no evidence of osseous metastatic disease.  She completed chemotherapy on 9/20/23. She reports that she is still "feeling sick." She complains of neuropathy of the fingers and feet, a painful tingling/numbness sensation. She was recently started on gabapentin tid, but does not feel t is working. She also reports bilateral leg pain and bilateral lower leg swelling. She was referred to opthalmology for right eye swelling, and was noted to have high eye pressure, started on latanoprost eye drops.  The vision in the right eye remains blurry but the swelling and the pain of the eye resolved. She also reports new swelling of the right side of her right breast and upper arm region. She has not been initiated on PT or lymphedema therapy. She remains fatigued, previously had on and off fevers which have resolved. She was taking ibuprofen for the leg pain but stopped due to gastritis like symptoms. Her LMP was in 9/2023, spotting.

## 2024-01-20 NOTE — REVIEW OF SYSTEMS
[Fever] : no fever [Eye Pain] : no eye pain [Palpitations] : no palpitations [Cough] : no cough [Shortness Of Breath] : no shortness of breath [Muscle Weakness] : no muscle weakness [Skin Rash] : no skin rash [de-identified] : decreased quality of life due to the symptoms s/p chemotherapy.

## 2024-01-20 NOTE — DISEASE MANAGEMENT
[TTNM] : 2 [MTNM] : 0 [NTNM] : 1a [de-identified] : 5150 cGy [de-identified] : 5240 cGy [de-identified] : rt breast

## 2024-01-20 NOTE — HISTORY OF PRESENT ILLNESS
[FreeTextEntry1] : Ms. Mckinley is a 53-year-old female stage IIA, T2N1a(sn)M0 moderately differentiated invasive ductal carcinoma of the right breast, ER >90%, GA >90%, HER2 2+ CISH non-amplified, and Oncotype Dx 27. Pathology was remarkable for extensive DCIS and LVI. She underwent lumpectomy with negative margins and had a positive sentinel node biopsy. She completed a course of adjuvant chemotherapy. She is currently receiving adjuvant radiation therapy.   She started Neurontin 300 mg qhs and feels better.  She continues having she is occasional discomfort in the right breast and mild darkening of the skin, has not worsened since last visit. has some mild fatigue as well. She is still in a foot brace after her fall and broken toe, feels better though, less pain.

## 2024-01-24 ENCOUNTER — NON-APPOINTMENT (OUTPATIENT)
Age: 54
End: 2024-01-24

## 2024-01-25 ENCOUNTER — APPOINTMENT (OUTPATIENT)
Dept: RADIATION ONCOLOGY | Facility: CLINIC | Age: 54
End: 2024-01-25
Payer: COMMERCIAL

## 2024-01-25 ENCOUNTER — APPOINTMENT (OUTPATIENT)
Dept: RADIATION ONCOLOGY | Facility: CLINIC | Age: 54
End: 2024-01-25

## 2024-01-25 VITALS
RESPIRATION RATE: 18 BRPM | HEART RATE: 84 BPM | SYSTOLIC BLOOD PRESSURE: 137 MMHG | DIASTOLIC BLOOD PRESSURE: 88 MMHG | OXYGEN SATURATION: 98 % | TEMPERATURE: 36.6 F

## 2024-01-25 PROCEDURE — 99024 POSTOP FOLLOW-UP VISIT: CPT

## 2024-01-30 NOTE — REVIEW OF SYSTEMS
[Skin Hyperpigmentation: Grade 1 - Hyperpigmentation covering <10% BSA; no psychosocial impact] : Skin Hyperpigmentation: Grade 1 - Hyperpigmentation covering <10% BSA; no psychosocial impact [FreeTextEntry9] : Generalized pain

## 2024-01-30 NOTE — PHYSICAL EXAM
[Sclera] : the sclera and conjunctiva were normal [] : no respiratory distress [Heart Rate And Rhythm] : heart rate and rhythm were normal [No UE Edema] : there is no upper extremity edema [Abdomen Soft] : soft [Nondistended] : nondistended [Normal] : no palpable adenopathy [Supraclavicular Lymph Nodes Enlarged Bilaterally] : supraclavicular [Axillary Lymph Nodes Enlarged Bilaterally] : axillary [de-identified] : Right lumpectomy and axillary scars are well healed, mild residual hyperpigmentation, mild induration upper outer breast and axilla, no masses

## 2024-01-30 NOTE — HISTORY OF PRESENT ILLNESS
[FreeTextEntry1] : Ms. Mckinley is a 53-year-old female stage IIA, T2N1a(sn)M0 moderately differentiated invasive ductal carcinoma of the right breast, ER >90%, AK >90%, HER2 2+ CISH non-amplified, and Oncotype Dx 27. Pathology was remarkable for extensive DCIS and LVI. She underwent lumpectomy with negative margins and had a positive sentinel node biopsy. She completed a course of adjuvant chemotherapy. She received a course of adjuvant radiation therapy to the right breast, a dose of 5240cGy, completed on 12/14/23.  She comes today for a post-treatment evaluation. Her chief complaint is pain affecting her shoulders, back and knees. She was having knee pain prior to treatment and was treated by an orthopedist with some improvement, but now the pains returned. She is also having headaches. She is not back to her usual activities due to neuropathy causing pain in hands and feet. She broke her toe last month and she still has pain and wears a brace on the left foot. She continues to take gabapentin and meloxicam with minor improvement in pain. She developed grade 1 dermatitis from radiation therapy. There has been some improvement in the skin since completing radiation therapy. She continues to moisturize the skin, which became dark after treatment but is now improved. Her right breast still feels tender and sore. She feels a firmness in the right axillary region. She has a good appetite and denies dysphagia. She denies cough or shortness of breath. She started Tamoxifen on 1/1/24. She was advised to stop Tamoxifen 10 days prior to her upcoming hysterectomy on 2/27/24.

## 2024-01-30 NOTE — VITALS
[Maximal Pain Intensity: 8/10] : 8/10 [70: Cares for self; unalbe to carry on normal activity or do active work.] : 70: Cares for self; unable to carry on normal activity or do active work. [OTC] : OTC [NSAID/Non-Opioid] : NSAID/Non-Opioid [Adjuvant (neuroleptics)] : adjuvant (neuroleptics)

## 2024-02-01 ENCOUNTER — NON-APPOINTMENT (OUTPATIENT)
Age: 54
End: 2024-02-01

## 2024-02-08 ENCOUNTER — APPOINTMENT (OUTPATIENT)
Dept: HEMATOLOGY ONCOLOGY | Facility: CLINIC | Age: 54
End: 2024-02-08
Payer: COMMERCIAL

## 2024-02-08 PROCEDURE — 99215 OFFICE O/P EST HI 40 MIN: CPT

## 2024-02-08 NOTE — PHYSICAL EXAM
[Ambulatory and capable of all self care but unable to carry out any work activities] : Status 2- Ambulatory and capable of all self care but unable to carry out any work activities. Up and about more than 50% of waking hours [Normal] : normal appearance, no rash, nodules, vesicles, ulcers, erythema [de-identified] : right breast hyperpigmentation during RT [de-identified] : left foot in brace

## 2024-02-08 NOTE — REVIEW OF SYSTEMS
[FreeTextEntry2] : + alopecia [Fatigue] : fatigue [Joint Pain] : joint pain [Negative] : Psychiatric [FreeTextEntry9] : shoulder pain, bialteral

## 2024-02-08 NOTE — PHYSICAL EXAM
[Ambulatory and capable of all self care but unable to carry out any work activities] : Status 2- Ambulatory and capable of all self care but unable to carry out any work activities. Up and about more than 50% of waking hours [Normal] : normal appearance, no rash, nodules, vesicles, ulcers, erythema [de-identified] : right breast hyperpigmentation during RT [de-identified] : left foot in brace

## 2024-02-08 NOTE — HISTORY OF PRESENT ILLNESS
[Disease: _____________________] : Disease: [unfilled] [T: ___] : T[unfilled] [N: ___] : N[unfilled] [M: ___] : M[unfilled] [AJCC Stage: ____] : AJCC Stage: [unfilled] [de-identified] : 53F, -American, premenopausal, PMHx HTN, OA, s/p knee surgery, referred for medical oncology consultation of right breast infiltrating ductal carcinoma diagnosed February 2023.   CASE SYNOPSIS:  12/2/2022 routine screening mammogram ([facility] radiology)  1.  Right 10:00, 4-5 cm from the nipple focal asymmetry and microcalcifications for which diagnostic mammogram/sonogram recommended  2.  The right 10:00, 5-6 cm from the nipple mass for which diagnostic mammogram and targeted sonography is recommended.  3.  Left 1:00 calcifications for which diagnostic mammography recommended.  Bilateral sonography should be considered given dense breast parenchyma.   2/2/2023 US guided core needle biopsy right breast ([facility] radiology) 2.5 cm mass 10:00, N 7 cm; pathology infiltrating ductal carcinoma, DCIS, ER 90%, OK 90% HER2 equivocal, negative by CISH   2/11/2023 breast MRI ([facility]): Recently diagnosed right breast malignancy characterized by 2.9 cm mass with adjacent satellite lesions.  6 mm enhancing mass in the left inner breast for which targeted US and US guided core needle biopsy recommended.  Prominent lymph nodes in the right axilla for which targeted US evaluation recommended with possible US guided core needle biopsy.   2/22/23: US guided core biopsy of :  1.right axilla 10:00, N11 cm  - reactive LN, negative for metastatic carcinoma.  2.left breast 9:00, N 2 cm-fibroadenomatoid nodule; pseudo-angiomatous stromal hyperplasia   3/2/2023: s/p 2 Mag seed localization   3/6/2023 right breast lumpectomy with SLNB (Dr. Max Love); pathology invasive moderately differentiated ductal carcinoma 3.5 cm, ER>90%, OK> 90%, Her 2 equivocal, CISH negative;  DCIS solid and cribriform types with intermediate to high nuclear grade, focal necrosis and microcalcifications, metastatic carcinoma involving 1 lymph node (9 mm), LVI identified.  Right inferior margin DCIS <0.5 mm from inked margin (pT2,p N1a)   3/16/23 CT C/A/P: No gross evidence for metastatic disease; Few nonspecific micronodules in the lungs and mild atelectasis. 3 month follow up is recommended.  3/31/2023: Oncotype DX 27 (distant recurrence risk at 9 years 21%).  4/21/2023 transthoracic echocardiogram: LVEF 68%  5/5/2023 C1 D1 Adriamycin/Cytoxan  9/20/23- competed AC X 4 , followed by Paclitaxel x 4  11/14- 12/14/2023- RT right breast  5240 cGy  1/1/2024- starts Tamoxifen 20 mg QD  [de-identified] : Moderately differentiated invasive ductal carcinoma [de-identified] : ER>90%, IN> 90%, Her 2 equivocal, CISH negative [FreeTextEntry1] : ACT x 4 [de-identified] : Short interval telehealth visit.  Patient is following up after completing 5240 cGy adjuvant RT between 11/14 and 12/14/2023.  Tolerating treatment with no significant side effects except for local erythema, now resolved.  She continues to complain of hot flashes and back pain and armpit bilateral discomfort.  Continues PT in ambulatory.  Scheduled for CHARLES/BSO on 2/27/2024.  Started tamoxifen on 1/1/2024; compliant with gabapentin 300 mg daily.

## 2024-02-08 NOTE — REASON FOR VISIT
[Initial Consultation] : an initial consultation [Follow-Up Visit] : a follow-up [FreeTextEntry2] : right breast cancer

## 2024-02-08 NOTE — REVIEW OF SYSTEMS
Pulmonary and Critical Care Medicine  Consult Note  Encounter Date: 3/19/2021 7:29 AM    Ms. Olivia Machado is a 70 y.o. female  : 1942  Requesting Provider: Lisa Gomez MD    Reason for request: resp failure          HISTORY OF PRESENT ILLNESS:    Patient is 70 y.o. presents to the emergency department with shortness of breath and respiratory distress. All the history is obtained from medical records as the patient is not able to provide any additional history at this time. The patient did have a virtual visit with her cardiologist yesterday. During that visit the patient stated that she has stopped her verapamil 3 days ago secondary to fatigue and weakness. She had taken an extra dose of diuretic that day given that she did not feel she was urinating enough. Her  had called and stated that her blood pressure was low but she was still awake and alert. She had just been complaining of feeling weak and tired. She denied any shortness of breath or pain at that time. Later the EMS was called secondary to shortness of breath. When the patient arrived in the emergency department she was noted to be unresponsive with agonal respirations. She was bradycardic and hypotensive. She was intubated in the emergency department by the ER physician. Her blood pressure did improve with IV fluid administration. Her heart rate did continue with bradycardia. A central line was placed in her right groin and the patient was started on dopamine and Levophed. She was also given additional IV fluids. Her blood pressure dropped despite these measures and the patient had a CODE BLUE. She did have return of spontaneous circulation after approximately 16 minutes. In the ER physician note there is documentation of a hemoglobin noted to be 5, however I cannot find this result in the labs anywhere. The lowest hemoglobin I can see in the labs is in the 7 range.   She did receive 1 unit of uncrossed match blood in the emergency department given the reports of his hemoglobin being 5. Patient was subsequently admitted to the hospitalist service. Pulmonary is consulted secondary to the acute respiratory failure. Patient did arrive in the ICU on 3 vasopressors. However overnight she was able to be weaned off these medications and is currently hypertensive. She does remain on support of the ventilator. She does not have any sedation in place. She is currently on the cooling protocol. She did reach a goal temperature at 1900 yesterday and will complete the cooling portion at 1900 today. Past Medical History:        Diagnosis Date    Anxiety     Asthma     dx 2019 / has smoked since age 17    CHF (congestive heart failure) (HCC)     Chronic back pain     Bilateral L5 S1 Radic on emg--surprisingly worse on the left than the right--pt's symptoms and her MRI show worse on the right    Chronic obstructive pulmonary disease with acute exacerbation ([address] 10/12/2019    Depression     Fibromyalgia     Hyperlipidemia     meds > 8 yrs    Hypertension     meds > 45 yrs    On home O2     2l per n/c at bedtime mostly,     Osteoarthritis     Type II diabetes mellitus, uncontrolled ([address]     hx > 8 yrs    Unspecified sleep apnea        Past Surgical History:        Procedure Laterality Date    BACK SURGERY  2017    lumbar disc    CARDIAC CATHETERIZATION  11/3/14     DR. MIRELES / no stents    COLONOSCOPY  08/29/2016    w/polypectomy     COLONOSCOPY N/A 9/29/2020    COLONOSCOPY WITH POLYPECTOMY performed by Elin Vidales MD at [facility] N/A 10/7/2019    EUA HYSTEROSCOPY DILATATION AND CURETTAGE performed by Silverio Eisenmenger, DO at [address] [place] [address] ENDOSCOPY, COLON, DIAGNOSTIC      EYE SURGERY      [place] with IOL OU / [address] [place]  [address]    umbilical hernia repair    [place] ESOPHAGOGASTRODUODENOSCOPY TRANSORAL DIAGNOSTIC N/A 3/24/2017    EGD ESOPHAGOGASTRODUODENOSCOPY performed by Pablito Arrington MD at [facility] N/A 2018    negative findings    TN REVISE MEDIAN N/CARPAL TUNNEL SURG Left 2017    LEFT  CARPAL [facility] N[facility]A 2018    TONSILLECTOMY      as child    UPPER GASTROINTESTINAL ENDOSCOPY  2016    w/bx     UPPER GASTROINTESTINAL ENDOSCOPY N/A 2020    EGD possible biopsy performed by Dede Ellison MD at [address] [facility] 2020    EGD PUSH ENTEROSCOPY performed by Kelley Lopez MD at [address] History:     reports that she has quit smoking. Her smoking use included cigarettes. She started smoking about 3 years ago. She has a 59.00 pack-year smoking history. She has never used smokeless tobacco. She reports previous alcohol use. She reports that she does not use drugs. .     Family History:       Problem Relation Age of Onset    Heart Disease Father         cardiac bypass    Arthritis Father     Arthritis Mother     Other Mother          at age 80    Other Sister         [facility]    No Known Problems Daughter     Stroke Son        Allergies:  Ibuprofen, Metformin and related, and Darvon [propoxyphene hcl]        MEDICATIONS during current hospitalization:    Continuous Infusions:   sodium chloride      insulin 8.36 Units/hr (21 0700)    dextrose      pantoprozole (PROTONIX) infusion 8 mg/hr (21 0121)    lactated ringers Stopped (21 0410)       Scheduled Meds:   insulin lispro  0-6 Units Subcutaneous 6 times per day    pantoprazole (PROTONIX) bolus  80 mg Intravenous Once    chlorhexidine  15 mL Mouth/Throat BID    sodium chloride flush  10 mL Intravenous 2 times per day    ipratropium-albuterol  1 ampule Inhalation 4x daily       PRN Meds:hydrALAZINE **OR** hydrALAZINE, 100*   [id number] 21* 22 25     O2 Device: Ventilator  Lab Results   Component Value Date    LACTA 3.7 03/19/2021    LACTA 3.7 03/19/2021    LACTA 6.0 03/18/2021       Radiology     Images of chest x-ray from 3/18 and 3/19 reviewed--no reports available at this time      Assessment/Plan:     1. Cardiac arrest with successful ROSC--she did present to the emergency department with complaints of shortness of breath. She was noted to be hypotensive and bradycardic upon presentation. She did require intubation. She later had a CODE BLUE secondary to PEA arrest.  She did have return of circulation after approximately 60-minute downtime. The patient was started on 3 vasopressor agents prior to being transferred to the ICU. She has now been weaned off of these blood pressure medications and is somewhat hypertensive. She is currently on the cooling protocol. She did not have a CT scan of her head performed as part of her work-up prior to arrival in the ICU. She would benefit from neurology evaluation, however this can be completed once her cooling phase has finished. She will complete the cooling portion around 7 PM today. Once this is completed I will order a CT scan of her head. She would also benefit from neurology evaluation tomorrow. Given that she is currently cooling, they likely will have little to offer at this point. Cardiology has been consulted and will evaluate the patient. 2. Acute respiratory failure--patient currently does not have any evidence of hypoxia or hypercapnia on her blood gases. At this time we will continue with current ventilatory support until her neurologic function has been better assessed. 3. Shock--this is likely cardiogenic in nature. This has subsequently resolved. She is off of all current vasopressor agents. She was started back on some antihypertensive medication secondary to hypertension.     4. Acute kidney injury--patient's creatinine has worsened since presentation to the hospital.  She initially had poor urine output, however had improved urine output after her vasopressor agents were able to be weaned off last evening. At this time we will continue to monitor I's and O's and creatinine level. 5. Anemia--patient was reportedly have a hemoglobin of 5 in the emergency department. However upon review of the lab results I am unable to locate this result. The lowest hemoglobin that I am able to find is in the 7 range. He did receive 1 unit of uncrossed matched blood in the emergency department. Gastroenterology has been consulted. She currently is on a Protonix drip. 6. Diabetes mellitus--she was started on insulin drip secondary to hyperglycemia. This is currently at 7. Nutrition: She is currently n.p.o. She can be initiated on trophic tube feeds today. Code Status: Full code    Prophylaxis: She is currently receiving a Protonix drip and does not require additional stress ulcer prophylaxis. She does have SCDs in place for DVT prophylaxis. No additional pharmacologic DVT prophylaxis given the anemia upon presentation. This is a 70 y.o. critically ill female from cardiac arrest and respiratory failure. I did spend a total of 32 minutes of critical care time with the patient, and review of the chart, and discussion with the bedside staff. This time is exclusive of any billable procedures.     Thank you for consultation    Electronically signed by Al Dallas DO, on 3/19/2021 at 7:29 AM [FreeTextEntry2] : + alopecia [Fatigue] : fatigue [Joint Pain] : joint pain [Negative] : Psychiatric [FreeTextEntry9] : shoulder pain, bialteral

## 2024-02-08 NOTE — ASSESSMENT
[FreeTextEntry1] : Ms. JARRETT 's questions were answered to her satisfaction.  She  expressed her  understanding and willingness to comply with the above recommendations, and  will return to the office in 3 months.

## 2024-02-08 NOTE — HISTORY OF PRESENT ILLNESS
[Disease: _____________________] : Disease: [unfilled] [T: ___] : T[unfilled] [N: ___] : N[unfilled] [M: ___] : M[unfilled] [AJCC Stage: ____] : AJCC Stage: [unfilled] [de-identified] : 53F, -American, premenopausal, PMHx HTN, OA, s/p knee surgery, referred for medical oncology consultation of right breast infiltrating ductal carcinoma diagnosed February 2023.   CASE SYNOPSIS:  12/2/2022 routine screening mammogram ([facility] radiology)  1.  Right 10:00, 4-5 cm from the nipple focal asymmetry and microcalcifications for which diagnostic mammogram/sonogram recommended  2.  The right 10:00, 5-6 cm from the nipple mass for which diagnostic mammogram and targeted sonography is recommended.  3.  Left 1:00 calcifications for which diagnostic mammography recommended.  Bilateral sonography should be considered given dense breast parenchyma.   2/2/2023 US guided core needle biopsy right breast ([facility] radiology) 2.5 cm mass 10:00, N 7 cm; pathology infiltrating ductal carcinoma, DCIS, ER 90%, MD 90% HER2 equivocal, negative by CISH   2/11/2023 breast MRI ([facility]): Recently diagnosed right breast malignancy characterized by 2.9 cm mass with adjacent satellite lesions.  6 mm enhancing mass in the left inner breast for which targeted US and US guided core needle biopsy recommended.  Prominent lymph nodes in the right axilla for which targeted US evaluation recommended with possible US guided core needle biopsy.   2/22/23: US guided core biopsy of :  1.right axilla 10:00, N11 cm  - reactive LN, negative for metastatic carcinoma.  2.left breast 9:00, N 2 cm-fibroadenomatoid nodule; pseudo-angiomatous stromal hyperplasia   3/2/2023: s/p 2 Mag seed localization   3/6/2023 right breast lumpectomy with SLNB (Dr. Max Love); pathology invasive moderately differentiated ductal carcinoma 3.5 cm, ER>90%, MD> 90%, Her 2 equivocal, CISH negative;  DCIS solid and cribriform types with intermediate to high nuclear grade, focal necrosis and microcalcifications, metastatic carcinoma involving 1 lymph node (9 mm), LVI identified.  Right inferior margin DCIS <0.5 mm from inked margin (pT2,p N1a)   3/16/23 CT C/A/P: No gross evidence for metastatic disease; Few nonspecific micronodules in the lungs and mild atelectasis. 3 month follow up is recommended.  3/31/2023: Oncotype DX 27 (distant recurrence risk at 9 years 21%).  4/21/2023 transthoracic echocardiogram: LVEF 68%  5/5/2023 C1 D1 Adriamycin/Cytoxan  9/20/23- competed AC X 4 , followed by Paclitaxel x 4  11/14- 12/14/2023- RT right breast  5240 cGy  1/1/2024- starts Tamoxifen 20 mg QD  [de-identified] : Moderately differentiated invasive ductal carcinoma [de-identified] : ER>90%, NM> 90%, Her 2 equivocal, CISH negative [FreeTextEntry1] : ACT x 4 [de-identified] : Short interval telehealth visit.  Patient is following up after completing 5240 cGy adjuvant RT between 11/14 and 12/14/2023.  Tolerating treatment with no significant side effects except for local erythema, now resolved.  She continues to complain of hot flashes and back pain and armpit bilateral discomfort.  Continues PT in ambulatory.  Scheduled for CHARLES/BSO on 2/27/2024.  Started tamoxifen on 1/1/2024; compliant with gabapentin 300 mg daily.

## 2024-02-11 ENCOUNTER — NON-APPOINTMENT (OUTPATIENT)
Age: 54
End: 2024-02-11

## 2024-02-17 ENCOUNTER — APPOINTMENT (OUTPATIENT)
Dept: ULTRASOUND IMAGING | Facility: CLINIC | Age: 54
End: 2024-02-17
Payer: COMMERCIAL

## 2024-02-17 ENCOUNTER — OUTPATIENT (OUTPATIENT)
Dept: OUTPATIENT SERVICES | Facility: HOSPITAL | Age: 54
LOS: 1 days | End: 2024-02-17
Payer: COMMERCIAL

## 2024-02-17 ENCOUNTER — APPOINTMENT (OUTPATIENT)
Dept: MAMMOGRAPHY | Facility: CLINIC | Age: 54
End: 2024-02-17
Payer: COMMERCIAL

## 2024-02-17 ENCOUNTER — RESULT REVIEW (OUTPATIENT)
Age: 54
End: 2024-02-17

## 2024-02-17 DIAGNOSIS — C50.911 MALIGNANT NEOPLASM OF UNSPECIFIED SITE OF RIGHT FEMALE BREAST: ICD-10-CM

## 2024-02-17 DIAGNOSIS — Z98.890 OTHER SPECIFIED POSTPROCEDURAL STATES: Chronic | ICD-10-CM

## 2024-02-17 PROCEDURE — G0279: CPT | Mod: 26

## 2024-02-17 PROCEDURE — G0279: CPT

## 2024-02-17 PROCEDURE — 76641 ULTRASOUND BREAST COMPLETE: CPT | Mod: 26,50

## 2024-02-17 PROCEDURE — 77066 DX MAMMO INCL CAD BI: CPT | Mod: 26

## 2024-02-17 PROCEDURE — 76641 ULTRASOUND BREAST COMPLETE: CPT

## 2024-02-17 PROCEDURE — 77066 DX MAMMO INCL CAD BI: CPT

## 2024-02-24 ENCOUNTER — APPOINTMENT (OUTPATIENT)
Dept: INFUSION THERAPY | Facility: HOSPITAL | Age: 54
End: 2024-02-24

## 2024-02-24 RX ORDER — LIDOCAINE AND PRILOCAINE 25; 25 MG/G; MG/G
2.5-2.5 CREAM TOPICAL
Qty: 1 | Refills: 2 | Status: ACTIVE | COMMUNITY
Start: 2023-05-06 | End: 1900-01-01

## 2024-02-26 ENCOUNTER — RESULT REVIEW (OUTPATIENT)
Age: 54
End: 2024-02-26

## 2024-02-26 NOTE — ASU PATIENT PROFILE, ADULT - FALL HARM RISK - UNIVERSAL INTERVENTIONS
Bed in lowest position, wheels locked, appropriate side rails in place/Call bell, personal items and telephone in reach/Instruct patient to call for assistance before getting out of bed or chair/Non-slip footwear when patient is out of bed/[place] to call system/Physically safe environment - no spills, clutter or unnecessary equipment/Purposeful Proactive Rounding/Room/bathroom lighting operational, light cord in reach

## 2024-02-26 NOTE — ASU PATIENT PROFILE, ADULT - VISION (WITH CORRECTIVE LENSES IF THE PATIENT USUALLY WEARS THEM):
[facility]    Normal vision: sees adequately in most situations; can see medication labels, newsprint

## 2024-02-26 NOTE — ASU PATIENT PROFILE, ADULT - TOBACCO USE
Never smoker Niacinamide Pregnancy And Lactation Text: These medications are considered safe during pregnancy.

## 2024-02-27 ENCOUNTER — TRANSCRIPTION ENCOUNTER (OUTPATIENT)
Age: 54
End: 2024-02-27

## 2024-02-27 ENCOUNTER — OUTPATIENT (OUTPATIENT)
Dept: OUTPATIENT SERVICES | Facility: HOSPITAL | Age: 54
LOS: 1 days | Discharge: HOME | End: 2024-02-27
Payer: COMMERCIAL

## 2024-02-27 ENCOUNTER — RESULT REVIEW (OUTPATIENT)
Age: 54
End: 2024-02-27

## 2024-02-27 VITALS
RESPIRATION RATE: 15 BRPM | DIASTOLIC BLOOD PRESSURE: 72 MMHG | SYSTOLIC BLOOD PRESSURE: 143 MMHG | HEART RATE: 68 BPM | OXYGEN SATURATION: 94 %

## 2024-02-27 VITALS
HEART RATE: 99 BPM | TEMPERATURE: 97 F | HEIGHT: 63 IN | SYSTOLIC BLOOD PRESSURE: 145 MMHG | OXYGEN SATURATION: 100 % | WEIGHT: 190.04 LBS | DIASTOLIC BLOOD PRESSURE: 87 MMHG | RESPIRATION RATE: 18 BRPM

## 2024-02-27 DIAGNOSIS — Z40.03 ENCOUNTER FOR PROPHYLACTIC REMOVAL OF FALLOPIAN TUBE(S): ICD-10-CM

## 2024-02-27 DIAGNOSIS — Z98.890 OTHER SPECIFIED POSTPROCEDURAL STATES: Chronic | ICD-10-CM

## 2024-02-27 DIAGNOSIS — Z90.710 ACQUIRED ABSENCE OF BOTH CERVIX AND UTERUS: ICD-10-CM

## 2024-02-27 LAB
ABO RH CONFIRMATION: SIGNIFICANT CHANGE UP
BLD GP AB SCN SERPL QL: SIGNIFICANT CHANGE UP

## 2024-02-27 PROCEDURE — C1889: CPT

## 2024-02-27 PROCEDURE — C9399: CPT

## 2024-02-27 PROCEDURE — 86901 BLOOD TYPING SEROLOGIC RH(D): CPT

## 2024-02-27 PROCEDURE — 36415 COLL VENOUS BLD VENIPUNCTURE: CPT

## 2024-02-27 PROCEDURE — 88305 TISSUE EXAM BY PATHOLOGIST: CPT

## 2024-02-27 PROCEDURE — 58661 LAPAROSCOPY REMOVE ADNEXA: CPT

## 2024-02-27 PROCEDURE — 88112 CYTOPATH CELL ENHANCE TECH: CPT | Mod: 26

## 2024-02-27 PROCEDURE — 88112 CYTOPATH CELL ENHANCE TECH: CPT

## 2024-02-27 PROCEDURE — 86850 RBC ANTIBODY SCREEN: CPT

## 2024-02-27 PROCEDURE — 86900 BLOOD TYPING SEROLOGIC ABO: CPT

## 2024-02-27 PROCEDURE — 88309 TISSUE EXAM BY PATHOLOGIST: CPT

## 2024-02-27 PROCEDURE — 88305 TISSUE EXAM BY PATHOLOGIST: CPT | Mod: 26

## 2024-02-27 PROCEDURE — 88309 TISSUE EXAM BY PATHOLOGIST: CPT | Mod: 26

## 2024-02-27 RX ORDER — OXYCODONE HYDROCHLORIDE 5 MG/1
5 TABLET ORAL ONCE
Refills: 0 | Status: DISCONTINUED | OUTPATIENT
Start: 2024-02-27 | End: 2024-02-27

## 2024-02-27 RX ORDER — MELOXICAM 15 MG/1
1 TABLET ORAL
Refills: 0 | DISCHARGE

## 2024-02-27 RX ORDER — ONDANSETRON 8 MG/1
4 TABLET, FILM COATED ORAL ONCE
Refills: 0 | Status: COMPLETED | OUTPATIENT
Start: 2024-02-27 | End: 2024-02-27

## 2024-02-27 RX ORDER — DOCUSATE SODIUM 100 MG
1 CAPSULE ORAL
Qty: 30 | Refills: 0
Start: 2024-02-27 | End: 2024-03-27

## 2024-02-27 RX ORDER — TELMISARTAN 20 MG/1
1 TABLET ORAL
Qty: 0 | Refills: 0 | DISCHARGE

## 2024-02-27 RX ORDER — HYDROMORPHONE HYDROCHLORIDE 2 MG/ML
1 INJECTION INTRAMUSCULAR; INTRAVENOUS; SUBCUTANEOUS
Refills: 0 | Status: DISCONTINUED | OUTPATIENT
Start: 2024-02-27 | End: 2024-02-27

## 2024-02-27 RX ORDER — IBUPROFEN 200 MG
1 TABLET ORAL
Qty: 40 | Refills: 0
Start: 2024-02-27 | End: 2024-03-07

## 2024-02-27 RX ORDER — MONTELUKAST 4 MG/1
1 TABLET, CHEWABLE ORAL
Qty: 0 | Refills: 0 | DISCHARGE

## 2024-02-27 RX ORDER — SODIUM CHLORIDE 9 MG/ML
1000 INJECTION, SOLUTION INTRAVENOUS
Refills: 0 | Status: DISCONTINUED | OUTPATIENT
Start: 2024-02-27 | End: 2024-02-27

## 2024-02-27 RX ORDER — TAMOXIFEN CITRATE 20 MG/1
1 TABLET, FILM COATED ORAL
Refills: 0 | DISCHARGE

## 2024-02-27 RX ORDER — OXYCODONE HYDROCHLORIDE 5 MG/1
1 TABLET ORAL
Qty: 12 | Refills: 0
Start: 2024-02-27 | End: 2024-02-29

## 2024-02-27 RX ORDER — HYDROMORPHONE HYDROCHLORIDE 2 MG/ML
0.5 INJECTION INTRAMUSCULAR; INTRAVENOUS; SUBCUTANEOUS
Refills: 0 | Status: DISCONTINUED | OUTPATIENT
Start: 2024-02-27 | End: 2024-02-27

## 2024-02-27 RX ORDER — SIMETHICONE 80 MG/1
1 TABLET, CHEWABLE ORAL
Qty: 40 | Refills: 0
Start: 2024-02-27 | End: 2024-03-07

## 2024-02-27 RX ORDER — GABAPENTIN 400 MG/1
1 CAPSULE ORAL
Refills: 0 | DISCHARGE

## 2024-02-27 RX ORDER — ROSUVASTATIN CALCIUM 5 MG/1
1 TABLET ORAL
Qty: 0 | Refills: 0 | DISCHARGE

## 2024-02-27 RX ADMIN — OXYCODONE HYDROCHLORIDE 5 MILLIGRAM(S): 5 TABLET ORAL at 18:42

## 2024-02-27 RX ADMIN — HYDROMORPHONE HYDROCHLORIDE 0.5 MILLIGRAM(S): 2 INJECTION INTRAMUSCULAR; INTRAVENOUS; SUBCUTANEOUS at 12:17

## 2024-02-27 RX ADMIN — HYDROMORPHONE HYDROCHLORIDE 0.5 MILLIGRAM(S): 2 INJECTION INTRAMUSCULAR; INTRAVENOUS; SUBCUTANEOUS at 12:47

## 2024-02-27 RX ADMIN — ONDANSETRON 4 MILLIGRAM(S): 8 TABLET, FILM COATED ORAL at 15:21

## 2024-02-27 RX ADMIN — HYDROMORPHONE HYDROCHLORIDE 0.5 MILLIGRAM(S): 2 INJECTION INTRAMUSCULAR; INTRAVENOUS; SUBCUTANEOUS at 14:21

## 2024-02-27 NOTE — ASU DISCHARGE PLAN (ADULT/PEDIATRIC) - ASU DC SPECIAL INSTRUCTIONSFT
-Diet: Continue regular diet as tolerated.  - Activity: Avoid heavy lifting or straining (anything over 10-15 pounds) for at least 6 weeks. You may ambulate and otherwise resume normal daily activities as tolerated.   -Incisions: You may shower and allow soap and water to rinse over incisions. Please avoid scrubbing the areas and do not submerge your incisions in water for at least 2 weeks.  -Medications: You may resume your home medications. You may take Tylenol 650mg every 6 hours and alternate with Ibuprofen 600mg every 6 hours for pain. A prescription has been sent to your pharmacy for Oxycodone 5mg every 6 hours only as needed for severe breakthrough pain after taking Tylenol and Motrin. Please do not use this medication when driving or operating heavy machinery as it can make your drowsy.  -Follow-up: Please call the office to schedule a follow-up appointment with your doctor in 2 weeks, or follow-up as previously scheduled.  -Please call the office or return to the ED with persistent fever greater than 100.4F, chest pain, shortness of breath, uncontrollable nausea/vomiting/abdominal pain, constipation, bloody bowel movements, abdominal distention, inability to tolerate oral intake.  - Nothing in the vagina for 6-8 weeks  - Continue home medications  - Restart Tamoxifen 1 week after surgery. O-T Plasty Text: The defect edges were debeveled with a #15 scalpel blade.  Given the location of the defect, shape of the defect and the proximity to free margins an O-T plasty was deemed most appropriate.  Using a sterile surgical marker, an appropriate O-T plasty was drawn incorporating the defect and placing the expected incisions within the relaxed skin tension lines where possible.    The area thus outlined was incised deep to adipose tissue with a #15 scalpel blade.  The skin margins were undermined to an appropriate distance in all directions utilizing iris scissors.

## 2024-02-27 NOTE — BRIEF OPERATIVE NOTE - OPERATION/FINDINGS
Exam under anesthesia revealed an 8w size retroverted uterus, normal appearing cervix.    Laparoscopy showed normal uterus with a 2cm pedunculated fibroid, normal bilateral tubes and ovaries

## 2024-02-27 NOTE — BRIEF OPERATIVE NOTE - NSICDXBRIEFPROCEDURE_GEN_ALL_CORE_FT
PROCEDURES:  Laparoscopic hysterectomy, total, with BSO, uterus 250 g or less 27-Feb-2024 12:03:59  Chanel Pisano

## 2024-02-27 NOTE — ASU DISCHARGE PLAN (ADULT/PEDIATRIC) - CARE PROVIDER_API CALL
Christopher Chauhan  Obstetrics and Gynecology  [address] [place] [address]  Phone: [phone]  Fax: [phone]  Follow Up Time:

## 2024-02-27 NOTE — CHART NOTE - NSCHARTNOTEFT_GEN_A_CORE
PACU ANESTHESIA ADMISSION NOTE      Procedure: Laparoscopic hysterectomy, total, with BSO, uterus 250 g or less      Post op diagnosis:  Breast cancer, BRCA1 positive        ____  Intubated  TV:______       Rate: ______      FiO2: ______    __x__  Patent Airway    __x__  Full return of protective reflexes    __x__  Full recovery from anesthesia / back to baseline status    Vitals:  T(C): 36.3 (02-27-24 @ 08:30), Max: 36.3 (02-27-24 @ 07:50)  HR: 99 (02-27-24 @ 08:30) (99 - 99)  BP: 145/87 (02-27-24 @ 08:30) (145/87 - 145/87)  RR: 18 (02-27-24 @ 08:30) (18 - 18)  SpO2: 100% (02-27-24 @ 08:30) (100% - 100%)    Mental Status:  __x__ Awake   ___x__ Alert   _____ Drowsy   _____ Sedated    Nausea/Vomiting:  __x__ NO  ______Yes,   See Post - Op Orders          Pain Scale (0-10):  ___0__    Treatment: ____ None    __x__ See Post - Op/PCA Orders    Post - Operative Fluids:   ____ Oral   __x__ See Post - Op Orders    Plan: Discharge:   __x__Home       _____Floor     _____Critical Care    _____  Other:_________________    Comments: Patient had smooth intraoperative event, no anesthesia complication.  PACU Vital signs: HR:    84        BP:   184     /  93        RR:  16           O2 Sat:    99   %     Temp  96.8  Pt awake and alert at time of handoff.

## 2024-02-29 LAB — NON-GYNECOLOGICAL CYTOLOGY STUDY: SIGNIFICANT CHANGE UP

## 2024-03-02 LAB — SURGICAL PATHOLOGY STUDY: SIGNIFICANT CHANGE UP

## 2024-03-05 DIAGNOSIS — N72 INFLAMMATORY DISEASE OF CERVIX UTERI: ICD-10-CM

## 2024-03-05 DIAGNOSIS — C50.919 MALIGNANT NEOPLASM OF UNSPECIFIED SITE OF UNSPECIFIED FEMALE BREAST: ICD-10-CM

## 2024-03-05 DIAGNOSIS — N83.202 UNSPECIFIED OVARIAN CYST, LEFT SIDE: ICD-10-CM

## 2024-03-05 DIAGNOSIS — N83.201 UNSPECIFIED OVARIAN CYST, RIGHT SIDE: ICD-10-CM

## 2024-03-05 DIAGNOSIS — N80.03 ADENOMYOSIS OF THE UTERUS: ICD-10-CM

## 2024-03-05 DIAGNOSIS — N88.8 OTHER SPECIFIED NONINFLAMMATORY DISORDERS OF CERVIX UTERI: ICD-10-CM

## 2024-03-05 DIAGNOSIS — E78.5 HYPERLIPIDEMIA, UNSPECIFIED: ICD-10-CM

## 2024-03-05 DIAGNOSIS — D25.9 LEIOMYOMA OF UTERUS, UNSPECIFIED: ICD-10-CM

## 2024-03-05 DIAGNOSIS — I10 ESSENTIAL (PRIMARY) HYPERTENSION: ICD-10-CM

## 2024-03-05 DIAGNOSIS — Z85.3 PERSONAL HISTORY OF MALIGNANT NEOPLASM OF BREAST: ICD-10-CM

## 2024-03-14 DIAGNOSIS — Z98.890 OTHER SPECIFIED POSTPROCEDURAL STATES: ICD-10-CM

## 2024-03-15 ENCOUNTER — APPOINTMENT (OUTPATIENT)
Dept: OBGYN | Facility: CLINIC | Age: 54
End: 2024-03-15
Payer: COMMERCIAL

## 2024-03-15 VITALS — SYSTOLIC BLOOD PRESSURE: 112 MMHG | DIASTOLIC BLOOD PRESSURE: 76 MMHG | WEIGHT: 192 LBS | BODY MASS INDEX: 34.02 KG/M2

## 2024-03-15 LAB
BILIRUB UR QL STRIP: NORMAL
GLUCOSE UR-MCNC: NORMAL
HCG UR QL: 0.2 EU/DL
HCG UR QL: NEGATIVE
HGB UR QL STRIP.AUTO: NORMAL
KETONES UR-MCNC: NORMAL
LEUKOCYTE ESTERASE UR QL STRIP: NORMAL
NITRITE UR QL STRIP: NORMAL
PH UR STRIP: 5.5
PROT UR STRIP-MCNC: NORMAL
SP GR UR STRIP: 1.03

## 2024-03-15 PROCEDURE — 81003 URINALYSIS AUTO W/O SCOPE: CPT | Mod: QW

## 2024-03-15 PROCEDURE — 99212 OFFICE O/P EST SF 10 MIN: CPT | Mod: 25

## 2024-03-15 PROCEDURE — 81025 URINE PREGNANCY TEST: CPT

## 2024-03-15 NOTE — PHYSICAL EXAM
[Alert] : alert [Appropriately responsive] : appropriately responsive [No Lymphadenopathy] : no lymphadenopathy [No Acute Distress] : no acute distress [Regular Rate Rhythm] : regular rate rhythm [No Murmurs] : no murmurs [Soft] : soft [Clear to Auscultation B/L] : clear to auscultation bilaterally [Non-distended] : non-distended [No HSM] : No HSM [Non-tender] : non-tender [No Mass] : no mass [No Lesions] : no lesions [Oriented x3] : oriented x3

## 2024-03-15 NOTE — HISTORY OF PRESENT ILLNESS
[FreeTextEntry1] : pt here as f/u post operatively. no complaints. s/p TLH/BSO as prophylactic surgery for BRCA positive status. restarted previous medications incullding tamoxifen now. no current complaints. no bleeding or pain, no f/c/n/v, normal bladder/bowel function. currently 2 wks post op.

## 2024-03-20 ENCOUNTER — OUTPATIENT (OUTPATIENT)
Dept: OUTPATIENT SERVICES | Facility: HOSPITAL | Age: 54
LOS: 1 days | Discharge: HOME | End: 2024-03-20

## 2024-03-20 DIAGNOSIS — C50.911 MALIGNANT NEOPLASM OF UNSPECIFIED SITE OF RIGHT FEMALE BREAST: ICD-10-CM

## 2024-03-20 DIAGNOSIS — Z98.890 OTHER SPECIFIED POSTPROCEDURAL STATES: Chronic | ICD-10-CM

## 2024-04-02 ENCOUNTER — APPOINTMENT (OUTPATIENT)
Dept: INFUSION THERAPY | Facility: HOSPITAL | Age: 54
End: 2024-04-02

## 2024-04-02 ENCOUNTER — RESULT REVIEW (OUTPATIENT)
Age: 54
End: 2024-04-02

## 2024-04-02 ENCOUNTER — APPOINTMENT (OUTPATIENT)
Dept: HEMATOLOGY ONCOLOGY | Facility: CLINIC | Age: 54
End: 2024-04-02
Payer: COMMERCIAL

## 2024-04-02 VITALS
BODY MASS INDEX: 35.16 KG/M2 | HEART RATE: 83 BPM | DIASTOLIC BLOOD PRESSURE: 83 MMHG | SYSTOLIC BLOOD PRESSURE: 135 MMHG | WEIGHT: 198.39 LBS | OXYGEN SATURATION: 99 % | TEMPERATURE: 97.2 F | RESPIRATION RATE: 16 BRPM

## 2024-04-02 LAB
ALBUMIN SERPL ELPH-MCNC: 4.3 G/DL — SIGNIFICANT CHANGE UP (ref 3.3–5)
ALP SERPL-CCNC: 62 U/L — SIGNIFICANT CHANGE UP (ref 40–120)
ALT FLD-CCNC: 14 U/L — SIGNIFICANT CHANGE UP (ref 10–45)
ANION GAP SERPL CALC-SCNC: 12 MMOL/L — SIGNIFICANT CHANGE UP (ref 5–17)
AST SERPL-CCNC: 20 U/L — SIGNIFICANT CHANGE UP (ref 10–40)
BASOPHILS # BLD AUTO: 0.02 K/UL — SIGNIFICANT CHANGE UP (ref 0–0.2)
BASOPHILS NFR BLD AUTO: 0.4 % — SIGNIFICANT CHANGE UP (ref 0–2)
BILIRUB SERPL-MCNC: 0.3 MG/DL — SIGNIFICANT CHANGE UP (ref 0.2–1.2)
BUN SERPL-MCNC: 11 MG/DL — SIGNIFICANT CHANGE UP (ref 7–23)
CALCIUM SERPL-MCNC: 9.8 MG/DL — SIGNIFICANT CHANGE UP (ref 8.4–10.5)
CHLORIDE SERPL-SCNC: 106 MMOL/L — SIGNIFICANT CHANGE UP (ref 96–108)
CO2 SERPL-SCNC: 25 MMOL/L — SIGNIFICANT CHANGE UP (ref 22–31)
CREAT SERPL-MCNC: 0.85 MG/DL — SIGNIFICANT CHANGE UP (ref 0.5–1.3)
EGFR: 83 ML/MIN/1.73M2 — SIGNIFICANT CHANGE UP
EOSINOPHIL # BLD AUTO: 0.05 K/UL — SIGNIFICANT CHANGE UP (ref 0–0.5)
EOSINOPHIL NFR BLD AUTO: 1.1 % — SIGNIFICANT CHANGE UP (ref 0–6)
GLUCOSE SERPL-MCNC: 128 MG/DL — HIGH (ref 70–99)
HCT VFR BLD CALC: 31.4 % — LOW (ref 34.5–45)
HGB BLD-MCNC: 10.4 G/DL — LOW (ref 11.5–15.5)
IMM GRANULOCYTES NFR BLD AUTO: 0.2 % — SIGNIFICANT CHANGE UP (ref 0–0.9)
LYMPHOCYTES # BLD AUTO: 1.34 K/UL — SIGNIFICANT CHANGE UP (ref 1–3.3)
LYMPHOCYTES # BLD AUTO: 28.3 % — SIGNIFICANT CHANGE UP (ref 13–44)
MCHC RBC-ENTMCNC: 29.3 PG — SIGNIFICANT CHANGE UP (ref 27–34)
MCHC RBC-ENTMCNC: 33.1 G/DL — SIGNIFICANT CHANGE UP (ref 32–36)
MCV RBC AUTO: 88.5 FL — SIGNIFICANT CHANGE UP (ref 80–100)
MONOCYTES # BLD AUTO: 0.43 K/UL — SIGNIFICANT CHANGE UP (ref 0–0.9)
MONOCYTES NFR BLD AUTO: 9.1 % — SIGNIFICANT CHANGE UP (ref 2–14)
NEUTROPHILS # BLD AUTO: 2.89 K/UL — SIGNIFICANT CHANGE UP (ref 1.8–7.4)
NEUTROPHILS NFR BLD AUTO: 60.9 % — SIGNIFICANT CHANGE UP (ref 43–77)
NRBC # BLD: 0 /100 WBCS — SIGNIFICANT CHANGE UP (ref 0–0)
PLATELET # BLD AUTO: 173 K/UL — SIGNIFICANT CHANGE UP (ref 150–400)
POTASSIUM SERPL-MCNC: 3.6 MMOL/L — SIGNIFICANT CHANGE UP (ref 3.5–5.3)
POTASSIUM SERPL-SCNC: 3.6 MMOL/L — SIGNIFICANT CHANGE UP (ref 3.5–5.3)
PROT SERPL-MCNC: 7.4 G/DL — SIGNIFICANT CHANGE UP (ref 6–8.3)
RBC # BLD: 3.55 M/UL — LOW (ref 3.8–5.2)
RBC # FLD: 13.9 % — SIGNIFICANT CHANGE UP (ref 10.3–14.5)
SODIUM SERPL-SCNC: 143 MMOL/L — SIGNIFICANT CHANGE UP (ref 135–145)
WBC # BLD: 4.74 K/UL — SIGNIFICANT CHANGE UP (ref 3.8–10.5)
WBC # FLD AUTO: 4.74 K/UL — SIGNIFICANT CHANGE UP (ref 3.8–10.5)

## 2024-04-02 PROCEDURE — 99214 OFFICE O/P EST MOD 30 MIN: CPT

## 2024-04-03 LAB — 24R-OH-CALCIDIOL SERPL-MCNC: 17.2 NG/ML — LOW (ref 30–80)

## 2024-04-03 NOTE — HISTORY OF PRESENT ILLNESS
[Disease: _____________________] : Disease: [unfilled] [T: ___] : T[unfilled] [N: ___] : N[unfilled] [AJCC Stage: ____] : AJCC Stage: [unfilled] [M: ___] : M[unfilled] [de-identified] : 53F, -American, premenopausal, PMHx HTN, OA, s/p knee surgery, referred for medical oncology consultation of right breast infiltrating ductal carcinoma diagnosed February 2023.   CASE SYNOPSIS:  12/2/2022 routine screening mammogram ([facility] radiology)  1.  Right 10:00, 4-5 cm from the nipple focal asymmetry and microcalcifications for which diagnostic mammogram/sonogram recommended  2.  The right 10:00, 5-6 cm from the nipple mass for which diagnostic mammogram and targeted sonography is recommended.  3.  Left 1:00 calcifications for which diagnostic mammography recommended.  Bilateral sonography should be considered given dense breast parenchyma.   2/2/2023 US guided core needle biopsy right breast ([facility] radiology) 2.5 cm mass 10:00, N 7 cm; pathology infiltrating ductal carcinoma, DCIS, ER 90%, OK 90% HER2 equivocal, negative by CISH   2/11/2023 breast MRI ([facility]): Recently diagnosed right breast malignancy characterized by 2.9 cm mass with adjacent satellite lesions.  6 mm enhancing mass in the left inner breast for which targeted US and US guided core needle biopsy recommended.  Prominent lymph nodes in the right axilla for which targeted US evaluation recommended with possible US guided core needle biopsy.   2/22/23: US guided core biopsy of :  1.right axilla 10:00, N11 cm  - reactive LN, negative for metastatic carcinoma.  2.left breast 9:00, N 2 cm-fibroadenomatoid nodule; pseudo-angiomatous stromal hyperplasia   3/2/2023: s/p 2 Mag seed localization   3/6/2023 right breast lumpectomy with SLNB (Dr. Max Love); pathology invasive moderately differentiated ductal carcinoma 3.5 cm, ER>90%, OK> 90%, Her 2 equivocal, CISH negative;  DCIS solid and cribriform types with intermediate to high nuclear grade, focal necrosis and microcalcifications, metastatic carcinoma involving 1 lymph node (9 mm), LVI identified.  Right inferior margin DCIS <0.5 mm from inked margin (pT2,p N1a)   3/16/23 CT C/A/P: No gross evidence for metastatic disease; Few nonspecific micronodules in the lungs and mild atelectasis. 3 month follow up is recommended.  3/31/2023: Oncotype DX 27 (distant recurrence risk at 9 years 21%).  4/21/2023 transthoracic echocardiogram: LVEF 68%  5/5/2023 C1 D1 Adriamycin/Cytoxan  9/20/23- competed AC X 4 , followed by Paclitaxel x 4  11/14- 12/14/2023- RT right breast  5240 cGy  1/1/2024- starts Tamoxifen 20 mg QD  2/27/2024 mammogram/breast US-no mammographic/sonographic evidence of malignancy; postsurgical seroma 3.2 x 0.8 x 2.5 centimeters, consistent with postsurgical seroma situated at 10 o'clock position, N 6cm in the region of palpable concern.  [de-identified] : Moderately differentiated invasive ductal carcinoma [de-identified] : ER>90%, [place]> 90%, Her 2 equivocal, CISH negative [FreeTextEntry1] : ACT x 4 [de-identified] : Last telehealth visit in early February 2024. On 2/27/2024 mammogram/breast US showed no mammographic/sonographic evidence of malignancy; postsurgical seroma 3.2 x 0.8 x 2.5 cm, consistent with postsurgical seroma situated at 10 o'clock position, N 6cm in the region of palpable concern.  Patient continues to complain of right arm swelling; currently applying topical lidocaine patches with minimal improvement in symptoms.  Receiving PT twice a week for bilateral knee pain.  Presents occasional dyspnea, but does not require O2 supplementation and it does not prevent her from being active.  Compliant with tamoxifen started in January 2024.  Physical examination unchanged; no evidence of lymphedema in the right upper extremity.  Medication includes Tylenol and gabapentin.

## 2024-04-03 NOTE — REVIEW OF SYSTEMS
[Fatigue] : fatigue [Joint Pain] : joint pain [Negative] : Psychiatric [FreeTextEntry9] : shoulder pain, bialteral

## 2024-04-03 NOTE — ASSESSMENT
[FreeTextEntry1] : Ms. JARRTET 's questions were answered to her satisfaction.  She  expressed her  understanding and willingness to comply with the above recommendations, and  will return to the office in 3 months.

## 2024-04-08 DIAGNOSIS — R52 PAIN, UNSPECIFIED: ICD-10-CM

## 2024-04-17 ENCOUNTER — LABORATORY RESULT (OUTPATIENT)
Age: 54
End: 2024-04-17

## 2024-04-17 ENCOUNTER — APPOINTMENT (OUTPATIENT)
Dept: SURGICAL ONCOLOGY | Facility: CLINIC | Age: 54
End: 2024-04-17
Payer: COMMERCIAL

## 2024-04-17 ENCOUNTER — APPOINTMENT (OUTPATIENT)
Dept: HEMATOLOGY ONCOLOGY | Facility: CLINIC | Age: 54
End: 2024-04-17

## 2024-04-17 PROCEDURE — 99215 OFFICE O/P EST HI 40 MIN: CPT

## 2024-04-17 PROCEDURE — 10005 FNA BX W/US GDN 1ST LES: CPT

## 2024-04-17 NOTE — PROCEDURE
[FreeTextEntry1] : US-guided FNA of RT breast fluid collection  performed under sterile conditions using 1% lidocaine with epinephrine. 7 ccs of serosanguineous fluid aspirated  Slides sent for cytology  Sterile dressing applied  Pt tolerated procedure well

## 2024-04-17 NOTE — ASSESSMENT
[FreeTextEntry1] : Right breast cancer-TALIA Continue tamoxifen as per medical oncology Follow-up cytology from FNA Physical therapy for range of motion exercises right upper extremity -symptoms primarily related to postradiation therapy changes RTO 4 months

## 2024-04-17 NOTE — HISTORY OF PRESENT ILLNESS
[de-identified] : Patient is a 54 y/o female who presents a follow up for right breast cancer.  Competed AC X 4 , followed by Paclitaxel x 4 9/2023. She completed XRT to the right breast 12/2023 She started on Tamoxifen 20 mg QD 1/2024, tolerating well.  She is undergoing PT for shoulder   S/p TLH/BSO as prophylactic surgery 3/2024.   2/27/2024 mammogram/breast US-no mammographic/sonographic evidence of malignancy; postsurgical seroma 3.2 x 0.8 x 2.5 centimeters, consistent with postsurgical seroma situated at 10 position, N 6cm in the region of palpable concern. (BI-RADS 2)  Oncotype Dx: 27  3/6/2023 S/p right breast lumpectomy with SLNB; pathology invasive moderately differentiated ductal carcinoma 3.5 cm, ER>90%, MN> 90%, Her 2 equivocal, CISH negative; DCIS solid and cribriform types with intermediate to high nuclear grade, focal necrosis and microcalcifications, metastatic carcinoma involving 1 lymph node (9 mm), LVI identified. Right inferior margin DCIS <0.5 mm from inked margin (pT2,p N1a)  2/22/23: US guided core biopsy: 1.right axilla 10:00, N11 cm - reactive LN, negative for metastatic carcinoma. 2.left breast 9:00, N 2 cm-fibroadenomatoid nodule; pseudo-angiomatous stromal hyperplasia  2/11/2023 Breast MRI ([facility]): Recently diagnosed right breast malignancy characterized by 2.9 cm mass with adjacent satellite lesions. 6 mm enhancing mass in the left inner breast for which targeted US and US guided core needle biopsy recommended. Prominent lymph nodes in the right axilla for which targeted US evaluation recommended with possible US guided core needle biopsy.  2/2/2023 US guided core needle biopsy right breast ([facility]) 2.5 cm mass 10:00, N 7 cm; pathology infiltrating ductal carcinoma, DCIS, ER 90%, MN 90% HER2 equivocal, negative by CISH  12/2/2022 routine screening mammogram 1. Right 10:00, 4-5 cm from the nipple focal asymmetry and microcalcifications for which diagnostic mammogram/sonogram recommended 2. The right 10:00, 5-6 cm from the nipple mass for whic diagnostic mammogram and targeted sonography is recommended. 3. Left 1:00 calcifications for which diagnostic mammography recommended. Bilateral sonography should be considered given dense breast parenchyma.

## 2024-04-17 NOTE — ADDENDUM
[FreeTextEntry1] : I, Neda Gonzalez, acted solely as a scribe for Dr. Max Love on this date 04/17/2024.

## 2024-04-24 ENCOUNTER — RX RENEWAL (OUTPATIENT)
Age: 54
End: 2024-04-24

## 2024-04-24 RX ORDER — TAMOXIFEN CITRATE 20 MG/1
20 TABLET, FILM COATED ORAL DAILY
Qty: 1 | Refills: 1 | Status: ACTIVE | COMMUNITY
Start: 2023-12-08 | End: 1900-01-01

## 2024-04-25 ENCOUNTER — APPOINTMENT (OUTPATIENT)
Dept: RADIATION ONCOLOGY | Facility: CLINIC | Age: 54
End: 2024-04-25
Payer: COMMERCIAL

## 2024-04-25 VITALS
BODY MASS INDEX: 35.08 KG/M2 | OXYGEN SATURATION: 100 % | SYSTOLIC BLOOD PRESSURE: 149 MMHG | RESPIRATION RATE: 16 BRPM | WEIGHT: 198 LBS | HEART RATE: 76 BPM | DIASTOLIC BLOOD PRESSURE: 87 MMHG | TEMPERATURE: 97.5 F

## 2024-04-25 DIAGNOSIS — C50.911 MALIGNANT NEOPLASM OF UNSPECIFIED SITE OF RIGHT FEMALE BREAST: ICD-10-CM

## 2024-04-25 PROCEDURE — 99212 OFFICE O/P EST SF 10 MIN: CPT

## 2024-05-01 PROBLEM — C50.911 BREAST CANCER, RIGHT: Status: ACTIVE | Noted: 2023-02-09

## 2024-05-01 NOTE — PHYSICAL EXAM
[Sclera] : the sclera and conjunctiva were normal [] : no respiratory distress [Heart Rate And Rhythm] : heart rate and rhythm were normal [No UE Edema] : there is no upper extremity edema [Abdomen Soft] : soft [Nondistended] : nondistended [Supraclavicular Lymph Nodes Enlarged Bilaterally] : supraclavicular [Axillary Lymph Nodes Enlarged Bilaterally] : axillary [Normal] : oriented to person, place and time, the affect was normal, the mood was normal and not anxious [de-identified] : Right lumpectomy and axillary scars are well healed, mild residual hyperpigmentation, mild induration of breast, no masses

## 2024-05-01 NOTE — HISTORY OF PRESENT ILLNESS
[FreeTextEntry1] : Ms. Mckinley is a 53-year-old female stage IIA, T2N1a(sn)M0 moderately differentiated invasive ductal carcinoma of the right breast, ER >90%, IA >90%, HER2 2+ CISH non-amplified, and Oncotype Dx 27. Pathology was remarkable for extensive DCIS and LVI. She underwent lumpectomy with negative margins and had a positive sentinel node biopsy. She completed a course of adjuvant chemotherapy. She received a course of adjuvant radiation therapy to the right breast, a dose of 5240cGy, completed on 12/14/23.  She comes today for a routine follow up. Unchanged pain in her shoulders, back and knees. She was having back and knee pain prior to radiation treatment and was treated by an orthopedist with some improvement. She continues to have right shoulder pain. She is applying topical lidocaine patches to the right shoulder with minimal improvement. She is receiving PT twice a week for bilateral knee pain. She reports continued neuropathy in her hands and feet, affecting her ability to perform her usual activities. She continues to take gabapentin and meloxicam with improvement in pain. She feels her right breast is healed since the treatment. She continues to feel a firmness in the right axillary region. She started Tamoxifen on 1/1/24 and reports headaches and hot flashes,   She underwent hysterectomy on 2/27/24. On 2/17/2024 mammogram/breast US showed no mammographic/sonographic evidence of malignancy; postsurgical seroma 3.2 x 0.8 x 2.5 cm, consistent with postsurgical seroma situated at 10 o'clock position, N 6cm in the region of palpable concern, BI RADS2. On 4/17/2024 had US-guided FNA of RT breast fluid collection performed under sterile conditions, 7 ccs of serosanguineous fluid aspirated.

## 2024-05-01 NOTE — REASON FOR VISIT
[Post-Treatment Evaluation] : post-treatment evaluation for [Breast Cancer] : breast cancer [Routine Follow-Up] : routine follow-up visit for

## 2024-05-04 ENCOUNTER — APPOINTMENT (OUTPATIENT)
Dept: INFUSION THERAPY | Facility: HOSPITAL | Age: 54
End: 2024-05-04

## 2024-05-09 ENCOUNTER — NON-APPOINTMENT (OUTPATIENT)
Age: 54
End: 2024-05-09

## 2024-05-16 ENCOUNTER — NON-APPOINTMENT (OUTPATIENT)
Age: 54
End: 2024-05-16

## 2024-06-09 ENCOUNTER — APPOINTMENT (OUTPATIENT)
Dept: INFUSION THERAPY | Facility: HOSPITAL | Age: 54
End: 2024-06-09

## 2024-06-25 ENCOUNTER — NON-APPOINTMENT (OUTPATIENT)
Age: 54
End: 2024-06-25

## 2024-07-27 ENCOUNTER — APPOINTMENT (OUTPATIENT)
Dept: INFUSION THERAPY | Facility: HOSPITAL | Age: 54
End: 2024-07-27

## 2024-08-23 ENCOUNTER — OUTPATIENT (OUTPATIENT)
Dept: OUTPATIENT SERVICES | Facility: HOSPITAL | Age: 54
LOS: 1 days | Discharge: HOME | End: 2024-08-23

## 2024-08-23 DIAGNOSIS — Z98.890 OTHER SPECIFIED POSTPROCEDURAL STATES: Chronic | ICD-10-CM

## 2024-08-23 DIAGNOSIS — C50.911 MALIGNANT NEOPLASM OF UNSPECIFIED SITE OF RIGHT FEMALE BREAST: ICD-10-CM

## 2024-08-31 ENCOUNTER — RESULT REVIEW (OUTPATIENT)
Age: 54
End: 2024-08-31

## 2024-08-31 ENCOUNTER — APPOINTMENT (OUTPATIENT)
Dept: INFUSION THERAPY | Facility: HOSPITAL | Age: 54
End: 2024-08-31

## 2024-08-31 LAB
24R-OH-CALCIDIOL SERPL-MCNC: 26.9 NG/ML — LOW (ref 30–80)
ALBUMIN SERPL ELPH-MCNC: 4.2 G/DL — SIGNIFICANT CHANGE UP (ref 3.3–5)
ALP SERPL-CCNC: 64 U/L — SIGNIFICANT CHANGE UP (ref 40–120)
ALT FLD-CCNC: 19 U/L — SIGNIFICANT CHANGE UP (ref 10–45)
ANION GAP SERPL CALC-SCNC: 11 MMOL/L — SIGNIFICANT CHANGE UP (ref 5–17)
AST SERPL-CCNC: 21 U/L — SIGNIFICANT CHANGE UP (ref 10–40)
BASOPHILS # BLD AUTO: 0.03 K/UL — SIGNIFICANT CHANGE UP (ref 0–0.2)
BASOPHILS NFR BLD AUTO: 0.7 % — SIGNIFICANT CHANGE UP (ref 0–2)
BILIRUB SERPL-MCNC: 0.2 MG/DL — SIGNIFICANT CHANGE UP (ref 0.2–1.2)
BUN SERPL-MCNC: 19 MG/DL — SIGNIFICANT CHANGE UP (ref 7–23)
CALCIUM SERPL-MCNC: 9.7 MG/DL — SIGNIFICANT CHANGE UP (ref 8.4–10.5)
CHLORIDE SERPL-SCNC: 106 MMOL/L — SIGNIFICANT CHANGE UP (ref 96–108)
CO2 SERPL-SCNC: 26 MMOL/L — SIGNIFICANT CHANGE UP (ref 22–31)
CREAT SERPL-MCNC: 1.04 MG/DL — SIGNIFICANT CHANGE UP (ref 0.5–1.3)
EGFR: 65 ML/MIN/1.73M2 — SIGNIFICANT CHANGE UP
EOSINOPHIL # BLD AUTO: 0.06 K/UL — SIGNIFICANT CHANGE UP (ref 0–0.5)
EOSINOPHIL NFR BLD AUTO: 1.4 % — SIGNIFICANT CHANGE UP (ref 0–6)
GLUCOSE SERPL-MCNC: 97 MG/DL — SIGNIFICANT CHANGE UP (ref 70–99)
HCT VFR BLD CALC: 31.1 % — LOW (ref 34.5–45)
HGB BLD-MCNC: 10.3 G/DL — LOW (ref 11.5–15.5)
IMM GRANULOCYTES NFR BLD AUTO: 0.5 % — SIGNIFICANT CHANGE UP (ref 0–0.9)
LYMPHOCYTES # BLD AUTO: 1.87 K/UL — SIGNIFICANT CHANGE UP (ref 1–3.3)
LYMPHOCYTES # BLD AUTO: 44.1 % — HIGH (ref 13–44)
MCHC RBC-ENTMCNC: 29.4 PG — SIGNIFICANT CHANGE UP (ref 27–34)
MCHC RBC-ENTMCNC: 33.1 G/DL — SIGNIFICANT CHANGE UP (ref 32–36)
MCV RBC AUTO: 88.9 FL — SIGNIFICANT CHANGE UP (ref 80–100)
MONOCYTES # BLD AUTO: 0.54 K/UL — SIGNIFICANT CHANGE UP (ref 0–0.9)
MONOCYTES NFR BLD AUTO: 12.7 % — SIGNIFICANT CHANGE UP (ref 2–14)
NEUTROPHILS # BLD AUTO: 1.72 K/UL — LOW (ref 1.8–7.4)
NEUTROPHILS NFR BLD AUTO: 40.6 % — LOW (ref 43–77)
NRBC # BLD: 0 /100 WBCS — SIGNIFICANT CHANGE UP (ref 0–0)
PLATELET # BLD AUTO: 204 K/UL — SIGNIFICANT CHANGE UP (ref 150–400)
POTASSIUM SERPL-MCNC: 4.1 MMOL/L — SIGNIFICANT CHANGE UP (ref 3.5–5.3)
POTASSIUM SERPL-SCNC: 4.1 MMOL/L — SIGNIFICANT CHANGE UP (ref 3.5–5.3)
PROT SERPL-MCNC: 7.2 G/DL — SIGNIFICANT CHANGE UP (ref 6–8.3)
RBC # BLD: 3.5 M/UL — LOW (ref 3.8–5.2)
RBC # FLD: 14.3 % — SIGNIFICANT CHANGE UP (ref 10.3–14.5)
SODIUM SERPL-SCNC: 143 MMOL/L — SIGNIFICANT CHANGE UP (ref 135–145)
WBC # BLD: 4.24 K/UL — SIGNIFICANT CHANGE UP (ref 3.8–10.5)
WBC # FLD AUTO: 4.24 K/UL — SIGNIFICANT CHANGE UP (ref 3.8–10.5)

## 2024-09-05 ENCOUNTER — NON-APPOINTMENT (OUTPATIENT)
Age: 54
End: 2024-09-05

## 2024-09-05 ENCOUNTER — APPOINTMENT (OUTPATIENT)
Dept: HEMATOLOGY ONCOLOGY | Facility: CLINIC | Age: 54
End: 2024-09-05
Payer: COMMERCIAL

## 2024-09-05 NOTE — ASSESSMENT
[FreeTextEntry1] : Ms. JARRETT 's questions were answered to her satisfaction.  She  expressed her  understanding and willingness to comply with the above recommendations, and  will return to the office in 6 months.

## 2024-09-05 NOTE — HISTORY OF PRESENT ILLNESS
[Disease: _____________________] : Disease: [unfilled] [T: ___] : T[unfilled] [N: ___] : N[unfilled] [M: ___] : M[unfilled] [AJCC Stage: ____] : AJCC Stage: [unfilled] [de-identified] : Returning for short interval follow-up; last seen in office in April 2024.  Started PT for shoulder and knee arthritis, and continues treatment with tamoxifen started in January 2024.  Reports no other interval development of new symptoms, no changes in appetite or weight, and states she has become more active physically.  Hematologic profile unchanged since last visit./Breast US in February 2024 showed a postsurgical seroma of approximately 3 cm; followed up with Dr. Love (surgery) in April 2024.  Medication list unchanged. MsBrenda KOLBY   [de-identified] : 54F, -American, premenopausal, PMHx HTN, OA, s/p knee surgery, referred for medical oncology consultation of right breast infiltrating ductal carcinoma diagnosed February 2023.   CASE SYNOPSIS:  12/2/2022 routine screening mammogram ([facility] radiology)  1.  Right 10:00, 4-5 cm from the nipple focal asymmetry and microcalcifications for which diagnostic mammogram/sonogram recommended  2.  The right 10:00, 5-6 cm from the nipple mass for which diagnostic mammogram and targeted sonography is recommended.  3.  Left 1:00 calcifications for which diagnostic mammography recommended.  Bilateral sonography should be considered given dense breast parenchyma.   2/2/2023 US guided core needle biopsy right breast ([facility] radiology) 2.5 cm mass 10:00, N 7 cm; pathology infiltrating ductal carcinoma, DCIS, ER 90%, MS 90% HER2 equivocal, negative by CISH   2/11/2023 breast MRI ([facility]): Recently diagnosed right breast malignancy characterized by 2.9 cm mass with adjacent satellite lesions.  6 mm enhancing mass in the left inner breast for which targeted US and US guided core needle biopsy recommended.  Prominent lymph nodes in the right axilla for which targeted US evaluation recommended with possible US guided core needle biopsy.   2/22/23: US guided core biopsy of :  1.right axilla 10:00, N11 cm  - reactive LN, negative for metastatic carcinoma.  2.left breast 9:00, N 2 cm-fibroadenomatoid nodule; pseudo-angiomatous stromal hyperplasia   3/2/2023: s/p 2 Mag seed localization   3/6/2023 right breast lumpectomy with SLNB (Dr. Max Love); pathology invasive moderately differentiated ductal carcinoma 3.5 cm, ER>90%, MS> 90%, Her 2 equivocal, CISH negative;  DCIS solid and cribriform types with intermediate to high nuclear grade, focal necrosis and microcalcifications, metastatic carcinoma involving 1 lymph node (9 mm), LVI identified.  Right inferior margin DCIS <0.5 mm from inked margin (pT2,p N1a)   3/16/23 CT C/A/P: No gross evidence for metastatic disease; Few nonspecific micronodules in the lungs and mild atelectasis. 3 month follow up is recommended.  3/31/2023: Oncotype DX 27 (distant recurrence risk at 9 years 21%).  4/21/2023 transthoracic echocardiogram: LVEF 68%  5/5/2023 C1 D1 Adriamycin/Cytoxan  9/20/23- competed AC X 4 , followed by Paclitaxel x 4  11/14- 12/14/2023- RT right breast  5240 cGy  1/1/2024- starts Tamoxifen 20 mg QD  2/27/2024 mammogram/breast US-no mammographic/sonographic evidence of malignancy; postsurgical seroma 3.2 x 0.8 x 2.5 centimeters, consistent with postsurgical seroma situated at 10 o'clock position, N 6cm in the region of palpable concern.  [de-identified] : Moderately differentiated invasive ductal carcinoma [de-identified] : ER>90%, [place]> 90%, Her 2 equivocal, CISH negative [FreeTextEntry1] : ACT x 4

## 2024-09-05 NOTE — HISTORY OF PRESENT ILLNESS
[Disease: _____________________] : Disease: [unfilled] [T: ___] : T[unfilled] [N: ___] : N[unfilled] [M: ___] : M[unfilled] [AJCC Stage: ____] : AJCC Stage: [unfilled] [de-identified] : Returning for short interval follow-up; last seen in office in April 2024.  Started PT for shoulder and knee arthritis, and continues treatment with tamoxifen started in January 2024.  Reports no other interval development of new symptoms, no changes in appetite or weight, and states she has become more active physically.  Hematologic profile unchanged since last visit./Breast US in February 2024 showed a postsurgical seroma of approximately 3 cm; followed up with Dr. Love (surgery) in April 2024.  Medication list unchanged. MsBrenda KOLBY   [de-identified] : 54F, -American, premenopausal, PMHx HTN, OA, s/p knee surgery, referred for medical oncology consultation of right breast infiltrating ductal carcinoma diagnosed February 2023.   CASE SYNOPSIS:  12/2/2022 routine screening mammogram ([facility] radiology)  1.  Right 10:00, 4-5 cm from the nipple focal asymmetry and microcalcifications for which diagnostic mammogram/sonogram recommended  2.  The right 10:00, 5-6 cm from the nipple mass for which diagnostic mammogram and targeted sonography is recommended.  3.  Left 1:00 calcifications for which diagnostic mammography recommended.  Bilateral sonography should be considered given dense breast parenchyma.   2/2/2023 US guided core needle biopsy right breast ([facility] radiology) 2.5 cm mass 10:00, N 7 cm; pathology infiltrating ductal carcinoma, DCIS, ER 90%, [place] 90% HER2 equivocal, negative by CISH   2/11/2023 breast MRI ([facility]): Recently diagnosed right breast malignancy characterized by 2.9 cm mass with adjacent satellite lesions.  6 mm enhancing mass in the left inner breast for which targeted US and US guided core needle biopsy recommended.  Prominent lymph nodes in the right axilla for which targeted US evaluation recommended with possible US guided core needle biopsy.   2/22/23: US guided core biopsy of :  1.right axilla 10:00, N11 cm  - reactive LN, negative for metastatic carcinoma.  2.left breast 9:00, N 2 cm-fibroadenomatoid nodule; pseudo-angiomatous stromal hyperplasia   3/2/2023: s/p 2 Mag seed localization   3/6/2023 right breast lumpectomy with SLNB (Dr. Max Love); pathology invasive moderately differentiated ductal carcinoma 3.5 cm, ER>90%, [place]> 90%, Her 2 equivocal, CISH negative;  DCIS solid and cribriform types with intermediate to high nuclear grade, focal necrosis and microcalcifications, metastatic carcinoma involving 1 lymph node (9 mm), LVI identified.  Right inferior margin DCIS <0.5 mm from inked margin (pT2,p N1a)   3/16/23 CT C/A/P: No gross evidence for metastatic disease; Few nonspecific micronodules in the lungs and mild atelectasis. 3 month follow up is recommended.  3/31/2023: Oncotype DX 27 (distant recurrence risk at 9 years 21%).  4/21/2023 transthoracic echocardiogram: LVEF 68%  5/5/2023 C1 D1 Adriamycin/Cytoxan  9/20/23- competed AC X 4 , followed by Paclitaxel x 4  11/14- 12/14/2023- RT right breast  5240 cGy  1/1/2024- starts Tamoxifen 20 mg QD  2/27/2024 mammogram/breast US-no mammographic/sonographic evidence of malignancy; postsurgical seroma 3.2 x 0.8 x 2.5 centimeters, consistent with postsurgical seroma situated at 10 o'clock position, N 6cm in the region of palpable concern.  [de-identified] : Moderately differentiated invasive ductal carcinoma [FreeTextEntry1] : ACT x 4 [de-identified] : ER>90%, [place]> 90%, Her 2 equivocal, CISH negative

## 2024-09-06 ENCOUNTER — APPOINTMENT (OUTPATIENT)
Dept: HEMATOLOGY ONCOLOGY | Facility: CLINIC | Age: 54
End: 2024-09-06
Payer: COMMERCIAL

## 2024-09-06 VITALS
RESPIRATION RATE: 17 BRPM | HEART RATE: 80 BPM | WEIGHT: 199.28 LBS | OXYGEN SATURATION: 98 % | TEMPERATURE: 97.6 F | BODY MASS INDEX: 35.31 KG/M2 | HEIGHT: 63 IN | SYSTOLIC BLOOD PRESSURE: 119 MMHG | DIASTOLIC BLOOD PRESSURE: 83 MMHG

## 2024-09-06 DIAGNOSIS — C50.911 MALIGNANT NEOPLASM OF UNSPECIFIED SITE OF RIGHT FEMALE BREAST: ICD-10-CM

## 2024-09-06 PROCEDURE — 99215 OFFICE O/P EST HI 40 MIN: CPT

## 2024-09-08 NOTE — HISTORY OF PRESENT ILLNESS
[Disease: _____________________] : Disease: [unfilled] [T: ___] : T[unfilled] [N: ___] : N[unfilled] [M: ___] : M[unfilled] [AJCC Stage: ____] : AJCC Stage: [unfilled] [de-identified] : 54F, -American, premenopausal, PMHx HTN, OA, s/p knee surgery, referred for medical oncology consultation of right breast infiltrating ductal carcinoma diagnosed February 2023.   CASE SYNOPSIS:  12/2/2022 routine screening mammogram ([facility] radiology)  1.  Right 10:00, 4-5 cm from the nipple focal asymmetry and microcalcifications for which diagnostic mammogram/sonogram recommended  2.  The right 10:00, 5-6 cm from the nipple mass for which diagnostic mammogram and targeted sonography is recommended.  3.  Left 1:00 calcifications for which diagnostic mammography recommended.  Bilateral sonography should be considered given dense breast parenchyma.   2/2/2023 US guided core needle biopsy right breast ([facility] radiology) 2.5 cm mass 10:00, N 7 cm; pathology infiltrating ductal carcinoma, DCIS, ER 90%, [place] 90% HER2 equivocal, negative by CISH   2/11/2023 breast MRI ([facility]): Recently diagnosed right breast malignancy characterized by 2.9 cm mass with adjacent satellite lesions.  6 mm enhancing mass in the left inner breast for which targeted US and US guided core needle biopsy recommended.  Prominent lymph nodes in the right axilla for which targeted US evaluation recommended with possible US guided core needle biopsy.   2/22/23: US guided core biopsy of :  1.right axilla 10:00, N11 cm  - reactive LN, negative for metastatic carcinoma.  2.left breast 9:00, N 2 cm-fibroadenomatoid nodule; pseudo-angiomatous stromal hyperplasia   3/2/2023: s/p 2 Mag seed localization   3/6/2023 right breast lumpectomy with SLNB (Dr. Max Love); pathology invasive moderately differentiated ductal carcinoma 3.5 cm, ER>90%, [place]> 90%, Her 2 equivocal, CISH negative;  DCIS solid and cribriform types with intermediate to high nuclear grade, focal necrosis and microcalcifications, metastatic carcinoma involving 1 lymph node (9 mm), LVI identified.  Right inferior margin DCIS <0.5 mm from inked margin (pT2,p N1a)   3/16/23 CT C/A/P: No gross evidence for metastatic disease; Few nonspecific micronodules in the lungs and mild atelectasis. 3 month follow up is recommended.  3/31/2023: Oncotype DX 27 (distant recurrence risk at 9 years 21%).  4/21/2023 transthoracic echocardiogram: LVEF 68%  5/5/2023 C1 D1 Adriamycin/Cytoxan  9/20/23- competed AC X 4 , followed by Paclitaxel x 4  11/14- 12/14/2023- RT right breast  5240 cGy  1/1/2024- starts Tamoxifen 20 mg QD  2/17/2024 mammogram/breast US-no mammographic/sonographic evidence of malignancy; postsurgical seroma 3.2 x 0.8 x 2.5 centimeters, consistent with postsurgical seroma situated at 10 o'clock position, N 6cm in the region of palpable concern.  2/22/2024-MRI right knee: 1.  Complex tear in the body and posterior horn of the lateral meniscus 2.  Tear in the body and posterior horn of the medial meniscus. 3.  Status post prior ACL graft reconstruction with graft retear 4.  Multiple subcentimeter erosive/osteochondral lesions on the anterior aspect of the lateral femoral condyle and lateral patellar facet, consistent with chronic impaction injury. 5.  Moderate to severe osteoarthritic changes. 6.  Mild joint effusion consistent with trauma or synovitis, in an appropriate clinical setting.  4/17/2024-FNA right breast 10:00: Final Diagnosis BREAST, RIGHT, 10 OCLOCK, FNA NEGATIVE FOR MALIGNANT CELLS IN THE SUBMITTED SPECIMEN. [de-identified] : Moderately differentiated invasive ductal carcinoma [de-identified] : ER>90%, [place]> 90%, Her 2 equivocal, CISH negative [FreeTextEntry1] : ACT x 4 [de-identified] : Returning for short interval follow-up; last seen in office in April 2024.  Started PT for shoulder and knee arthritis, and continues treatment with tamoxifen started in January 2024.  Reports persistent paresthesia and neuropathic pain for which she continues to take gabapentin.  Also complaining of occasional lapses in memory and confusion which she attributes to chemotherapy.  Reports discomfort right chest wall, and induration of right breast at the incision line for which she has follow-up with  next week.  Continues to have Ofatpt-a-Clmj flush at 2-month interval.  It has been a year since completion of systemic chemotherapy, and patient is inquiring about removing Jepdgq-e-Ktbx.  Continues PT and ambulatory.

## 2024-09-08 NOTE — PHYSICAL EXAM
[Restricted in physically strenuous activity but ambulatory and able to carry out work of a light or sedentary nature] : Status 1- Restricted in physically strenuous activity but ambulatory and able to carry out work of a light or sedentary nature, e.g., light house work, office work [Normal] : normal appearance, no rash, nodules, vesicles, ulcers, erythema [de-identified] : Right lumpectomy and axillary scars are well healed, mild residual hyperpigmentation, mild induration of breast, no masses.

## 2024-09-08 NOTE — HISTORY OF PRESENT ILLNESS
[Disease: _____________________] : Disease: [unfilled] [T: ___] : T[unfilled] [N: ___] : N[unfilled] [M: ___] : M[unfilled] [AJCC Stage: ____] : AJCC Stage: [unfilled] [de-identified] : 54F, -American, premenopausal, PMHx HTN, OA, s/p knee surgery, referred for medical oncology consultation of right breast infiltrating ductal carcinoma diagnosed February 2023.   CASE SYNOPSIS:  12/2/2022 routine screening mammogram ([facility] radiology)  1.  Right 10:00, 4-5 cm from the nipple focal asymmetry and microcalcifications for which diagnostic mammogram/sonogram recommended  2.  The right 10:00, 5-6 cm from the nipple mass for which diagnostic mammogram and targeted sonography is recommended.  3.  Left 1:00 calcifications for which diagnostic mammography recommended.  Bilateral sonography should be considered given dense breast parenchyma.   2/2/2023 US guided core needle biopsy right breast ([facility] radiology) 2.5 cm mass 10:00, N 7 cm; pathology infiltrating ductal carcinoma, DCIS, ER 90%, [place] 90% HER2 equivocal, negative by CISH   2/11/2023 breast MRI ([facility]): Recently diagnosed right breast malignancy characterized by 2.9 cm mass with adjacent satellite lesions.  6 mm enhancing mass in the left inner breast for which targeted US and US guided core needle biopsy recommended.  Prominent lymph nodes in the right axilla for which targeted US evaluation recommended with possible US guided core needle biopsy.   2/22/23: US guided core biopsy of :  1.right axilla 10:00, N11 cm  - reactive LN, negative for metastatic carcinoma.  2.left breast 9:00, N 2 cm-fibroadenomatoid nodule; pseudo-angiomatous stromal hyperplasia   3/2/2023: s/p 2 Mag seed localization   3/6/2023 right breast lumpectomy with SLNB (Dr. Max Love); pathology invasive moderately differentiated ductal carcinoma 3.5 cm, ER>90%, [place]> 90%, Her 2 equivocal, CISH negative;  DCIS solid and cribriform types with intermediate to high nuclear grade, focal necrosis and microcalcifications, metastatic carcinoma involving 1 lymph node (9 mm), LVI identified.  Right inferior margin DCIS <0.5 mm from inked margin (pT2,p N1a)   3/16/23 CT C/A/P: No gross evidence for metastatic disease; Few nonspecific micronodules in the lungs and mild atelectasis. 3 month follow up is recommended.  3/31/2023: Oncotype DX 27 (distant recurrence risk at 9 years 21%).  4/21/2023 transthoracic echocardiogram: LVEF 68%  5/5/2023 C1 D1 Adriamycin/Cytoxan  9/20/23- competed AC X 4 , followed by Paclitaxel x 4  11/14- 12/14/2023- RT right breast  5240 cGy  1/1/2024- starts Tamoxifen 20 mg QD  2/17/2024 mammogram/breast US-no mammographic/sonographic evidence of malignancy; postsurgical seroma 3.2 x 0.8 x 2.5 centimeters, consistent with postsurgical seroma situated at 10 o'clock position, N 6cm in the region of palpable concern.  2/22/2024-MRI right knee: 1.  Complex tear in the body and posterior horn of the lateral meniscus 2.  Tear in the body and posterior horn of the medial meniscus. 3.  Status post prior ACL graft reconstruction with graft retear 4.  Multiple subcentimeter erosive/osteochondral lesions on the anterior aspect of the lateral femoral condyle and lateral patellar facet, consistent with chronic impaction injury. 5.  Moderate to severe osteoarthritic changes. 6.  Mild joint effusion consistent with trauma or synovitis, in an appropriate clinical setting.  4/17/2024-FNA right breast 10:00: Final Diagnosis BREAST, RIGHT, 10 OCLOCK, FNA NEGATIVE FOR MALIGNANT CELLS IN THE SUBMITTED SPECIMEN. [de-identified] : Moderately differentiated invasive ductal carcinoma [de-identified] : ER>90%, [place]> 90%, Her 2 equivocal, CISH negative [FreeTextEntry1] : ACT x 4 [de-identified] : Returning for short interval follow-up; last seen in office in April 2024.  Started PT for shoulder and knee arthritis, and continues treatment with tamoxifen started in January 2024.  Reports persistent paresthesia and neuropathic pain for which she continues to take gabapentin.  Also complaining of occasional lapses in memory and confusion which she attributes to chemotherapy.  Reports discomfort right chest wall, and induration of right breast at the incision line for which she has follow-up with  next week.  Continues to have Wvhoza-e-Ivly flush at 2-month interval.  It has been a year since completion of systemic chemotherapy, and patient is inquiring about removing Sufpsf-g-Pyzm.  Continues PT and ambulatory.

## 2024-09-08 NOTE — PHYSICAL EXAM
[Restricted in physically strenuous activity but ambulatory and able to carry out work of a light or sedentary nature] : Status 1- Restricted in physically strenuous activity but ambulatory and able to carry out work of a light or sedentary nature, e.g., light house work, office work [Normal] : normal appearance, no rash, nodules, vesicles, ulcers, erythema [de-identified] : Right lumpectomy and axillary scars are well healed, mild residual hyperpigmentation, mild induration of breast, no masses.

## 2024-09-12 ENCOUNTER — APPOINTMENT (OUTPATIENT)
Dept: SURGICAL ONCOLOGY | Facility: CLINIC | Age: 54
End: 2024-09-12

## 2024-09-12 ENCOUNTER — NON-APPOINTMENT (OUTPATIENT)
Age: 54
End: 2024-09-12

## 2024-09-12 PROCEDURE — 99215 OFFICE O/P EST HI 40 MIN: CPT

## 2024-10-05 ENCOUNTER — APPOINTMENT (OUTPATIENT)
Dept: INFUSION THERAPY | Facility: HOSPITAL | Age: 54
End: 2024-10-05

## 2024-10-07 ENCOUNTER — APPOINTMENT (OUTPATIENT)
Dept: NEUROLOGY | Facility: CLINIC | Age: 54
End: 2024-10-07

## 2024-11-06 ENCOUNTER — OUTPATIENT (OUTPATIENT)
Dept: OUTPATIENT SERVICES | Facility: HOSPITAL | Age: 54
LOS: 1 days | Discharge: HOME | End: 2024-11-06

## 2024-11-06 DIAGNOSIS — C50.911 MALIGNANT NEOPLASM OF UNSPECIFIED SITE OF RIGHT FEMALE BREAST: ICD-10-CM

## 2024-11-06 DIAGNOSIS — Z98.890 OTHER SPECIFIED POSTPROCEDURAL STATES: Chronic | ICD-10-CM

## 2024-11-16 ENCOUNTER — APPOINTMENT (OUTPATIENT)
Dept: INFUSION THERAPY | Facility: HOSPITAL | Age: 54
End: 2024-11-16

## 2025-01-04 ENCOUNTER — APPOINTMENT (OUTPATIENT)
Dept: INFUSION THERAPY | Facility: HOSPITAL | Age: 55
End: 2025-01-04

## 2025-01-27 ENCOUNTER — LABORATORY RESULT (OUTPATIENT)
Age: 55
End: 2025-01-27

## 2025-01-27 ENCOUNTER — APPOINTMENT (OUTPATIENT)
Dept: NEUROLOGY | Facility: CLINIC | Age: 55
End: 2025-01-27
Payer: COMMERCIAL

## 2025-01-27 VITALS
OXYGEN SATURATION: 100 % | HEART RATE: 77 BPM | DIASTOLIC BLOOD PRESSURE: 85 MMHG | WEIGHT: 198 LBS | HEIGHT: 63 IN | SYSTOLIC BLOOD PRESSURE: 125 MMHG | BODY MASS INDEX: 35.08 KG/M2 | TEMPERATURE: 96.4 F

## 2025-01-27 DIAGNOSIS — G44.59 OTHER COMPLICATED HEADACHE SYNDROME: ICD-10-CM

## 2025-01-27 DIAGNOSIS — R51.9 HEADACHE, UNSPECIFIED: ICD-10-CM

## 2025-01-27 DIAGNOSIS — C79.31 SECONDARY MALIGNANT NEOPLASM OF BRAIN: ICD-10-CM

## 2025-01-27 DIAGNOSIS — Z85.3 PERSONAL HISTORY OF MALIGNANT NEOPLASM OF BREAST: ICD-10-CM

## 2025-01-27 PROCEDURE — 99204 OFFICE O/P NEW MOD 45 MIN: CPT

## 2025-01-28 LAB — ERYTHROCYTE [SEDIMENTATION RATE] IN BLOOD BY WESTERGREN METHOD: 53 MM/HR

## 2025-01-31 LAB — ANACR T: NEGATIVE

## 2025-02-03 ENCOUNTER — APPOINTMENT (OUTPATIENT)
Dept: MRI IMAGING | Facility: CLINIC | Age: 55
End: 2025-02-03
Payer: COMMERCIAL

## 2025-02-03 PROCEDURE — 0866T QUAN MRI ALYS BRN W/DX MRI: CPT

## 2025-02-03 PROCEDURE — A9585: CPT

## 2025-02-03 PROCEDURE — 70553 MRI BRAIN STEM W/O & W/DYE: CPT

## 2025-02-05 DIAGNOSIS — G44.009 CLUSTER HEADACHE SYNDROME, UNSPECIFIED, NOT INTRACTABLE: ICD-10-CM

## 2025-02-06 RX ORDER — UBROGEPANT 50 MG/1
50 TABLET ORAL
Qty: 10 | Refills: 0 | Status: ACTIVE | COMMUNITY
Start: 2025-02-05 | End: 1900-01-01

## 2025-02-07 LAB
AMPA-R ABCBA: NEGATIVE
AMPHIPHYSIN IGG TITR SER IF: NEGATIVE
ANNOTATION COMMENT IMP: NORMAL
CASPR2-IGG CBA, S: NEGATIVE
CV2 IGG TITR SER: NEGATIVE
GABA-B ABCBA: NEGATIVE
GAD65 AB SER-MCNC: 0 NMOL/L
GFAP IFA, S: NEGATIVE
GLIAL NUC TYPE 1 AB TITR SER: NEGATIVE
HU1 AB TITR SER: NEGATIVE
HU2 AB TITR SER IF: NEGATIVE
HU3 AB TITR SER: NEGATIVE
IMMUNOLOGIST REVIEW: NORMAL
LGI1-IGG CBA, S: NEGATIVE
MGLUR1 AB IFA, S: NEGATIVE
NEUROCHONDRIN-IFA, SERUM: NEGATIVE
NIF IFA, S: NEGATIVE
NMDA-R ABCBA: NEGATIVE
PCA-1 AB TITR SER: NEGATIVE
PCA-2 AB TITR SER: NEGATIVE
PCA-TR AB TITR SER: NEGATIVE

## 2025-02-07 RX ORDER — GALCANEZUMAB 100 MG/ML
100 INJECTION, SOLUTION SUBCUTANEOUS
Qty: 3 | Refills: 11 | Status: ACTIVE | COMMUNITY
Start: 2025-02-05 | End: 1900-01-01

## 2025-02-20 ENCOUNTER — APPOINTMENT (OUTPATIENT)
Dept: SURGICAL ONCOLOGY | Facility: CLINIC | Age: 55
End: 2025-02-20

## 2025-02-20 ENCOUNTER — OUTPATIENT (OUTPATIENT)
Dept: OUTPATIENT SERVICES | Facility: HOSPITAL | Age: 55
LOS: 1 days | Discharge: HOME | End: 2025-02-20

## 2025-02-20 DIAGNOSIS — C50.911 MALIGNANT NEOPLASM OF UNSPECIFIED SITE OF RIGHT FEMALE BREAST: ICD-10-CM

## 2025-02-20 DIAGNOSIS — Z98.890 OTHER SPECIFIED POSTPROCEDURAL STATES: Chronic | ICD-10-CM

## 2025-02-22 ENCOUNTER — RESULT REVIEW (OUTPATIENT)
Age: 55
End: 2025-02-22

## 2025-02-22 ENCOUNTER — APPOINTMENT (OUTPATIENT)
Dept: INFUSION THERAPY | Facility: HOSPITAL | Age: 55
End: 2025-02-22

## 2025-02-22 LAB
24R-OH-CALCIDIOL SERPL-MCNC: 23.1 NG/ML — LOW (ref 30–80)
BASOPHILS # BLD AUTO: 0.02 K/UL — SIGNIFICANT CHANGE UP (ref 0–0.2)
BASOPHILS NFR BLD AUTO: 0.5 % — SIGNIFICANT CHANGE UP (ref 0–2)
EOSINOPHIL # BLD AUTO: 0.11 K/UL — SIGNIFICANT CHANGE UP (ref 0–0.5)
EOSINOPHIL NFR BLD AUTO: 2.7 % — SIGNIFICANT CHANGE UP (ref 0–6)
HCT VFR BLD CALC: 31.9 % — LOW (ref 34.5–45)
HGB BLD-MCNC: 10.5 G/DL — LOW (ref 11.5–15.5)
IMM GRANULOCYTES NFR BLD AUTO: 0.5 % — SIGNIFICANT CHANGE UP (ref 0–0.9)
LYMPHOCYTES # BLD AUTO: 1.69 K/UL — SIGNIFICANT CHANGE UP (ref 1–3.3)
LYMPHOCYTES # BLD AUTO: 40.7 % — SIGNIFICANT CHANGE UP (ref 13–44)
MCHC RBC-ENTMCNC: 29.7 PG — SIGNIFICANT CHANGE UP (ref 27–34)
MCHC RBC-ENTMCNC: 32.9 G/DL — SIGNIFICANT CHANGE UP (ref 32–36)
MCV RBC AUTO: 90.1 FL — SIGNIFICANT CHANGE UP (ref 80–100)
MONOCYTES # BLD AUTO: 0.49 K/UL — SIGNIFICANT CHANGE UP (ref 0–0.9)
MONOCYTES NFR BLD AUTO: 11.8 % — SIGNIFICANT CHANGE UP (ref 2–14)
NEUTROPHILS # BLD AUTO: 1.82 K/UL — SIGNIFICANT CHANGE UP (ref 1.8–7.4)
NEUTROPHILS NFR BLD AUTO: 43.8 % — SIGNIFICANT CHANGE UP (ref 43–77)
NRBC BLD AUTO-RTO: 0 /100 WBCS — SIGNIFICANT CHANGE UP (ref 0–0)
PLATELET # BLD AUTO: 231 K/UL — SIGNIFICANT CHANGE UP (ref 150–400)
RBC # BLD: 3.54 M/UL — LOW (ref 3.8–5.2)
RBC # FLD: 13.7 % — SIGNIFICANT CHANGE UP (ref 10.3–14.5)
WBC # BLD: 4.15 K/UL — SIGNIFICANT CHANGE UP (ref 3.8–10.5)
WBC # FLD AUTO: 4.15 K/UL — SIGNIFICANT CHANGE UP (ref 3.8–10.5)

## 2025-02-23 LAB
ALBUMIN SERPL ELPH-MCNC: 3.9 G/DL — SIGNIFICANT CHANGE UP (ref 3.3–5)
ALP SERPL-CCNC: 76 U/L — SIGNIFICANT CHANGE UP (ref 40–120)
ALT FLD-CCNC: 16 U/L — SIGNIFICANT CHANGE UP (ref 10–45)
ANION GAP SERPL CALC-SCNC: 17 MMOL/L — SIGNIFICANT CHANGE UP (ref 5–17)
AST SERPL-CCNC: 19 U/L — SIGNIFICANT CHANGE UP (ref 10–40)
BILIRUB SERPL-MCNC: <0.2 MG/DL — SIGNIFICANT CHANGE UP (ref 0.2–1.2)
BUN SERPL-MCNC: 16 MG/DL — SIGNIFICANT CHANGE UP (ref 7–23)
CALCIUM SERPL-MCNC: 9.4 MG/DL — SIGNIFICANT CHANGE UP (ref 8.4–10.5)
CHLORIDE SERPL-SCNC: 104 MMOL/L — SIGNIFICANT CHANGE UP (ref 96–108)
CO2 SERPL-SCNC: 21 MMOL/L — LOW (ref 22–31)
CREAT SERPL-MCNC: 0.97 MG/DL — SIGNIFICANT CHANGE UP (ref 0.5–1.3)
EGFR: 70 ML/MIN/1.73M2 — SIGNIFICANT CHANGE UP
EGFR: 70 ML/MIN/1.73M2 — SIGNIFICANT CHANGE UP
GLUCOSE SERPL-MCNC: 69 MG/DL — LOW (ref 70–99)
POTASSIUM SERPL-MCNC: 4.3 MMOL/L — SIGNIFICANT CHANGE UP (ref 3.5–5.3)
POTASSIUM SERPL-SCNC: 4.3 MMOL/L — SIGNIFICANT CHANGE UP (ref 3.5–5.3)
PROT SERPL-MCNC: 7 G/DL — SIGNIFICANT CHANGE UP (ref 6–8.3)
SODIUM SERPL-SCNC: 142 MMOL/L — SIGNIFICANT CHANGE UP (ref 135–145)

## 2025-02-25 NOTE — ASU PREOPERATIVE ASSESSMENT, ADULT ([facility] ONLY) - HEIGHT IN CM
- Status post mechanical fall with the below noted injuries. - Fall precautions.  - PT and OT evaluation and treatment as indicated. - Case Management consultation for disposition planning. SUPPORTIVE AND PALLIATIVE ONCOLOGY INPATIENT FOLLOW-UP      SERVICE DATE: 02/25/25     Updates 02/25/25, recommended changes are bolded below:  Gabapentin as below  Consult -consult placed    ASSESSMENT/PLAN: 87 y.o. male with multiple comorbidities transferred from [facility] ED on 2/24 with jaundice, abdominal pain with scans showing new pancreatic mass with multiple hypodense liver lesions concerning for primary pancreatic cancer with mets to liver. Hospital course complicated by hyperbilirubinemia, thrombocytopenia, coagulopathy, ROMULO leukocytosis.   C.difficile sent due to diarrhea.  Supportive oncology consulted for introduction of service, pain management and goals of care moving forward      MRCP showed thrombus and not a candidate for stent. Awaiting oncology input-likely not a candidate for any cancer directed treatment     Symptom Management Plan: Recommended changes are bolded     Abdominal discomfort likely related to pancreatic mass and liver lesions.  Visceral.  Controlled  Neuropathic pain bilateral feet  OARRS/PDMP reviewed no aberrant behavior noted.consistent with  prescriptions/records and patient history   Home regimen: None  Intolerances/previously tried: None  Risk factors:  none  Renal function impaired  Total OME usage for the past 24 hours: None  Recommend gabapentin 100 mg p.o. nightly for neuropathy  Consider Dilaudid 0.1 mg every 4 hours as needed pain     At risk for nausea without vomiting related to opioids.  Well-controlled  Home regimen:  none  Monitor    Currently diarrhea likely due to pancreatic mass  Usual bowel pattern: multiple times per day  Home regimen: None  LBM today  Monitor  Can consider Imodium as needed     Impaired sleep related to hospital environmentMay be component of unfamiliar surroundings, and frequent interruptions while inpatient.   Home regimen:  none  Recommend good sleep hygiene, relaxation techniques (deep breathing techniques, meditation,  mindfulness), minimizing interruptions     Disposition:  Please  start the process of having prior authorization with meds to beds deliver medications to patient prior to discharge via [facility] pharmacy. Prescriptions will need to be sent 48-72 hours prior to discharge so that a prior authorization can be completed.   Discharge date: unknown pending acute issues  Will assess if patient needs an appointment with Outpatient Supportive Oncology as appropriate     SIGNATURE: Jacquelin Magaña MD  PAGER/CONTACT:  Contact information:  Supportive and Palliative Oncology  Monday-Friday 8 AM-5 PM  Epic Secure chat or pager [phone].  After hours and weekends:  pager [phone]    =================================================================    SUBJECTIVE:    Interval Events:  No acute events    Pain Assessment: Mild abdominal discomfort generalized could not tell me more details no aggravating or relieving factors.  Comfortable and does not want any medication adjustments    Opioid Requirements: None    Symptom Assessment:  Diarrhea a little  Nausea none  Shortness of breath none  Headache none  Anxiety none      Information obtained from: chart review, interview of patient, and discussion with primary team  ______________________________________________________________________     OBJECTIVE:    Lab Results   Component Value Date    WBC 26.2 (H) 02/25/2025    HGB 12.0 (L) 02/25/2025    HCT 32.8 (L) 02/25/2025    MCV 92 02/25/2025    PLT 43 (L) 02/25/2025     Lab Results   Component Value Date    GLUCOSE 97 02/25/2025    CALCIUM 8.7 02/25/2025     (L) 02/25/2025    K 4.3 02/25/2025    CO2 26 02/25/2025    CL 98 02/25/2025    BUN 54 (H) 02/25/2025    CREATININE 1.98 (H) 02/25/2025     Lab Results   Component Value Date     (H) 02/25/2025     (H) 02/25/2025    ALKPHOS 716 (H) 02/25/2025    BILITOT 36.0 (H) 02/25/2025     Estimated Creatinine Clearance: 32.9 mL/min (A) (by C-G formula based on SCr of 1.98 mg/dL  (H)).    MR abdomen w and wo IV contrast MRCP   Preliminary Result   There is are filling defects within the proximal IVC extending   proximally to the inferior cavoatrial junction and distally to the   intermediate and left hepatic veins, concerning for thrombus. There   is mass effect upon the right hepatic vein by an adjacent mass.        This is a preliminary resident report intended to identify and   communicate acutely critical findings. A full attending report will   follow.        MACRO:   Kirill Gutierrez discussed the significance and urgency of this   critical finding by telephone with  Dr. Haddad on 2/25/2025 at 3:54   am.  (**-RCF-**) Findings:  See findings.             Dictation workstation:   [id number]      Lower extremity venous duplex bilateral               Scheduled medications   [Held by provider] aspirin, 81 mg, oral, Daily  [Held by provider] lisinopril 10 mg, hydroCHLOROthiazide 12.5 mg for Zestoretic/Prinizide, , oral, Daily  meropenem, 1 g, intravenous, q12h      Continuous medications  sodium chloride 0.9%, 75 mL/hr, Last Rate: Stopped (02/24/25 1620)      PRN medications  dextrose, 12.5 g, q15 min PRN  dextrose, 25 g, q15 min PRN  glucagon, 1 mg, q15 min PRN  glucagon, 1 mg, q15 min PRN  loperamide, 2 mg, 4x daily PRN       }  PHYSICAL EXAMINATION:    Vital Signs:   Vital signs reviewed  Visit Vitals  /77   Pulse 70   Temp 36.4 °C (97.5 °F)   Resp 17        0-10 (Numeric) Pain Score: 0 - No pain     Physical Exam  Vitals reviewed.   Constitutional:       General: He is not in acute distress.     Appearance: He is ill-appearing.   HENT:      Head: Normocephalic and atraumatic.      Ears:      Comments: Hard of hearing     Mouth/Throat:      Mouth: Mucous membranes are moist.   Eyes:      General: Scleral icterus present.      Extraocular Movements: Extraocular movements intact.      Conjunctiva/sclera: Conjunctivae normal.      Pupils: Pupils are equal, round, and reactive  to light.   Cardiovascular:      Rate and Rhythm: Normal rate and regular rhythm.      Heart sounds: Normal heart sounds.   Pulmonary:      Effort: Pulmonary effort is normal. No respiratory distress.   Abdominal:      General: Bowel sounds are normal. There is distension.      Palpations: Abdomen is soft.   Musculoskeletal:         General: Swelling present. Normal range of motion.   Skin:     General: Skin is warm and dry.      Coloration: Skin is jaundiced.   Neurological:      Mental Status: He is alert and oriented to person, place, and time.   Psychiatric:         Mood and Affect: Mood normal.         Behavior: Behavior normal.     PALLIATIVE CARE ENCOUNTER:    Supportive and Palliative Oncology encounter:  Spoke with patient at bedside  Emotional support provided  Coordination of care:  medication changes    Advance Directives  Existence of Advance Directives:No - not interested  Decision maker: Surrogate decision maker is wife  Code Status: Full code    Medical Decision Making/Goals of Care/Advance Care Planning:  MRCP showed thrombus and no plan for stent, oncology will see the patient today however per primary team patient is not a candidate for any cancer treatment.  Awaiting oncology input    Advance Directives  Existence of Advance Directives:No - not interested  Decision maker: Surrogate decision maker is wife  Code Status: Full code    ==============================================================================    Signature and billing:    Medical complexity was high level due to due to complexity of problems, extensive data review, and high risk of management/treatment.    I spent 50 minutes in the care of this patient which included chart review, interviewing patient/family, discussion with primary team, coordination of care, and documentation.    Data:   Diagnostic tests and information reviewed for today's visit:  Most recent labs and imaging results, Medications.  CBC CMP shows leukocytosis,  anemia, thrombocytopenia, ROMULO, abnormal LFTs.  MRCP shows thrombus    Plan of Care discussed with: Provider and Patient    Thank you for asking Supportive and Palliative Oncology to assist with care of this patient.  Recommendations will be communicated back to the consulting service by way of shared electronic medical record/secure chat/email or face-to-face.   We will continue to follow  Please contact us for additional questions or concerns.      SIGNATURE: Jacquelin Magaña MD   PAGER/CONTACT:  Contact information:  Supportive and Palliative Oncology  Monday-Friday 8 AM-5 PM  Epic Secure chat or pager [phone].  After hours and weekends:  pager [phone]       [phone]

## 2025-02-27 ENCOUNTER — APPOINTMENT (OUTPATIENT)
Dept: HEMATOLOGY ONCOLOGY | Facility: CLINIC | Age: 55
End: 2025-02-27
Payer: COMMERCIAL

## 2025-02-27 DIAGNOSIS — H40.9 UNSPECIFIED GLAUCOMA: ICD-10-CM

## 2025-02-27 DIAGNOSIS — C50.911 MALIGNANT NEOPLASM OF UNSPECIFIED SITE OF RIGHT FEMALE BREAST: ICD-10-CM

## 2025-02-27 PROCEDURE — 99214 OFFICE O/P EST MOD 30 MIN: CPT

## 2025-03-13 ENCOUNTER — APPOINTMENT (OUTPATIENT)
Dept: SURGICAL ONCOLOGY | Facility: CLINIC | Age: 55
End: 2025-03-13
Payer: COMMERCIAL

## 2025-03-13 DIAGNOSIS — C50.911 MALIGNANT NEOPLASM OF UNSPECIFIED SITE OF RIGHT FEMALE BREAST: ICD-10-CM

## 2025-03-13 PROCEDURE — 99215 OFFICE O/P EST HI 40 MIN: CPT

## 2025-03-19 ENCOUNTER — APPOINTMENT (OUTPATIENT)
Dept: PLASTIC SURGERY | Facility: CLINIC | Age: 55
End: 2025-03-19
Payer: COMMERCIAL

## 2025-03-19 PROCEDURE — 99204 OFFICE O/P NEW MOD 45 MIN: CPT

## 2025-03-29 ENCOUNTER — APPOINTMENT (OUTPATIENT)
Dept: INFUSION THERAPY | Facility: HOSPITAL | Age: 55
End: 2025-03-29

## 2025-04-09 DIAGNOSIS — C79.31 SECONDARY MALIGNANT NEOPLASM OF BRAIN: ICD-10-CM

## 2025-05-02 ENCOUNTER — OUTPATIENT (OUTPATIENT)
Dept: OUTPATIENT SERVICES | Facility: HOSPITAL | Age: 55
LOS: 1 days | Discharge: HOME | End: 2025-05-02

## 2025-05-02 DIAGNOSIS — C50.911 MALIGNANT NEOPLASM OF UNSPECIFIED SITE OF RIGHT FEMALE BREAST: ICD-10-CM

## 2025-05-02 DIAGNOSIS — Z98.890 OTHER SPECIFIED POSTPROCEDURAL STATES: Chronic | ICD-10-CM

## 2025-05-03 ENCOUNTER — APPOINTMENT (OUTPATIENT)
Dept: INFUSION THERAPY | Facility: HOSPITAL | Age: 55
End: 2025-05-03

## 2025-05-21 ENCOUNTER — APPOINTMENT (OUTPATIENT)
Dept: RHEUMATOLOGY | Facility: CLINIC | Age: 55
End: 2025-05-21
Payer: COMMERCIAL

## 2025-05-21 VITALS
WEIGHT: 195 LBS | HEIGHT: 63 IN | SYSTOLIC BLOOD PRESSURE: 134 MMHG | HEART RATE: 102 BPM | BODY MASS INDEX: 34.55 KG/M2 | DIASTOLIC BLOOD PRESSURE: 87 MMHG | OXYGEN SATURATION: 96 % | RESPIRATION RATE: 17 BRPM | TEMPERATURE: 98 F

## 2025-05-21 DIAGNOSIS — M25.50 PAIN IN UNSPECIFIED JOINT: ICD-10-CM

## 2025-05-21 DIAGNOSIS — Z79.899 OTHER LONG TERM (CURRENT) DRUG THERAPY: ICD-10-CM

## 2025-05-21 DIAGNOSIS — D86.9 SARCOIDOSIS, UNSPECIFIED: ICD-10-CM

## 2025-05-21 DIAGNOSIS — E55.9 VITAMIN D DEFICIENCY, UNSPECIFIED: ICD-10-CM

## 2025-05-21 DIAGNOSIS — H20.9 UNSPECIFIED IRIDOCYCLITIS: ICD-10-CM

## 2025-05-21 PROCEDURE — 99204 OFFICE O/P NEW MOD 45 MIN: CPT

## 2025-05-23 ENCOUNTER — LABORATORY RESULT (OUTPATIENT)
Age: 55
End: 2025-05-23

## 2025-05-23 ENCOUNTER — APPOINTMENT (OUTPATIENT)
Dept: NEUROLOGY | Facility: CLINIC | Age: 55
End: 2025-05-23

## 2025-05-23 VITALS
HEART RATE: 95 BPM | HEIGHT: 63 IN | SYSTOLIC BLOOD PRESSURE: 133 MMHG | BODY MASS INDEX: 34.55 KG/M2 | OXYGEN SATURATION: 99 % | TEMPERATURE: 97 F | DIASTOLIC BLOOD PRESSURE: 84 MMHG | WEIGHT: 195 LBS

## 2025-05-23 DIAGNOSIS — E11.9 TYPE 2 DIABETES MELLITUS W/OUT COMPLICATIONS: ICD-10-CM

## 2025-05-23 LAB
24R-OH-CALCIDIOL SERPL-MCNC: 53.9 PG/ML
ALBUMIN SERPL ELPH-MCNC: 4.8 G/DL
ALP BLD-CCNC: 80 U/L
ALT SERPL-CCNC: 35 U/L
ANION GAP SERPL CALC-SCNC: 21 MMOL/L
APPEARANCE: CLEAR
AST SERPL-CCNC: 27 U/L
BACTERIA: ABNORMAL /HPF
BASOPHILS # BLD AUTO: 0.03 K/UL
BASOPHILS NFR BLD AUTO: 0.6 %
BILIRUB SERPL-MCNC: 0.3 MG/DL
BILIRUBIN URINE: NEGATIVE
BLOOD URINE: NEGATIVE
BUN SERPL-MCNC: 31 MG/DL
C3 SERPL-MCNC: 160 MG/DL
C4 SERPL-MCNC: 46 MG/DL
CALCIUM SERPL-MCNC: 10.2 MG/DL
CAST: 5 /LPF
CCP AB SER IA-ACNC: <8 U/ML
CHLORIDE SERPL-SCNC: 98 MMOL/L
CK SERPL-CCNC: 305 U/L
CO2 SERPL-SCNC: 20 MMOL/L
COLOR: YELLOW
CREAT SERPL-MCNC: 1.66 MG/DL
CREAT SPEC-SCNC: 168 MG/DL
CREAT/PROT UR: 0.1 RATIO
CRP SERPL-MCNC: 3 MG/L
DSDNA AB SER-ACNC: <1 IU/ML
EGFRCR SERPLBLD CKD-EPI 2021: 37 ML/MIN/1.73M2
ENA RNP AB SER IA-ACNC: 0.2 AL
ENA SM AB SER IA-ACNC: <0.2 AL
ENA SS-A AB SER IA-ACNC: <0.2 AL
ENA SS-B AB SER IA-ACNC: <0.2 AL
EOSINOPHIL # BLD AUTO: 0.09 K/UL
EOSINOPHIL NFR BLD AUTO: 1.7 %
EPITHELIAL CELLS: 9 /HPF
ERYTHROCYTE [SEDIMENTATION RATE] IN BLOOD BY WESTERGREN METHOD: 50 MM/HR
GLUCOSE QUALITATIVE U: NEGATIVE MG/DL
GLUCOSE SERPL-MCNC: 105 MG/DL
HBV CORE IGG+IGM SER QL: NONREACTIVE
HBV SURFACE AB SER QL: NONREACTIVE
HBV SURFACE AG SER QL: NONREACTIVE
HCT VFR BLD CALC: 39.1 %
HCV AB SER QL: NONREACTIVE
HCV S/CO RATIO: 0.13 S/CO
HGB BLD-MCNC: 12 G/DL
IMM GRANULOCYTES NFR BLD AUTO: 0.2 %
KETONES URINE: NEGATIVE MG/DL
LEUKOCYTE ESTERASE URINE: ABNORMAL
LUPUS ANTICOAGULANT CASCADE REFLEX: NORMAL
LYMPHOCYTES # BLD AUTO: 2.49 K/UL
LYMPHOCYTES NFR BLD AUTO: 47.9 %
MAN DIFF?: NORMAL
MCHC RBC-ENTMCNC: 29.3 PG
MCHC RBC-ENTMCNC: 30.7 G/DL
MCV RBC AUTO: 95.4 FL
MICROSCOPIC-UA: NORMAL
MONOCYTES # BLD AUTO: 0.69 K/UL
MONOCYTES NFR BLD AUTO: 13.3 %
NEUTROPHILS # BLD AUTO: 1.89 K/UL
NEUTROPHILS NFR BLD AUTO: 36.3 %
NITRITE URINE: NEGATIVE
PH URINE: 6
PLATELET # BLD AUTO: 229 K/UL
POTASSIUM SERPL-SCNC: 3.7 MMOL/L
PROT SERPL-MCNC: 8 G/DL
PROT UR-MCNC: 8 MG/DL
PROTEIN URINE: NEGATIVE MG/DL
RBC # BLD: 4.1 M/UL
RBC # FLD: 14.1 %
RED BLOOD CELLS URINE: 2 /HPF
REVIEW: NORMAL
RF+CCP IGG SER-IMP: NEGATIVE
RHEUMATOID FACT SER QL: 11 IU/ML
SODIUM SERPL-SCNC: 139 MMOL/L
SPECIFIC GRAVITY URINE: 1.02
UROBILINOGEN URINE: 0.2 MG/DL
WBC # FLD AUTO: 5.2 K/UL
WHITE BLOOD CELLS URINE: 16 /HPF
YEAST-LIKE CELLS: PRESENT

## 2025-05-23 PROCEDURE — 99215 OFFICE O/P EST HI 40 MIN: CPT

## 2025-05-24 LAB
CRP SERPL-MCNC: 3 MG/L
ESTIMATED AVERAGE GLUCOSE: 120 MG/DL
HBA1C MFR BLD HPLC: 5.8 %

## 2025-05-26 LAB
M TB IFN-G BLD-IMP: NEGATIVE
QUANTIFERON TB PLUS MITOGEN MINUS NIL: >10 IU/ML
QUANTIFERON TB PLUS NIL: 0.06 IU/ML
QUANTIFERON TB PLUS TB1 MINUS NIL: 0 IU/ML
QUANTIFERON TB PLUS TB2 MINUS NIL: 0 IU/ML

## 2025-05-27 ENCOUNTER — APPOINTMENT (OUTPATIENT)
Dept: RADIOLOGY | Facility: CLINIC | Age: 55
End: 2025-05-27

## 2025-05-27 LAB — ACE BLD-CCNC: 65 U/L

## 2025-05-28 LAB
ANA PAT FLD IF-IMP: ABNORMAL
ANA SER IF-ACNC: ABNORMAL
ASIALO-GM1 ANTIBODIES, IGG/IGM: 15 IV
GD1A ANTIBODIES, IGG/IGM: 7 IV
GD1B ANTIBODIES, IGG/IGM: 7 IV
GM1 ANTIBODIES, IGG/IGM: 8 IV
GM2 ANTIBODIES, IGG/IGM: 11 IV
GQ1B AB IGG: NORMAL TITER
GQ1B ANTIBODIES, IGG/IGM: 4 IV

## 2025-05-29 LAB — G6PD SER-CCNC: 10 U/G HGB

## 2025-05-30 LAB
ARSENIC BLD-MCNC: 15 UG/L
CADMIUM SERPL-MCNC: <0.5 UG/L
LEAD BLD-MCNC: <1 UG/DL
MERCURY BLD-MCNC: 1.3 UG/L

## 2025-06-02 NOTE — ASU DISCHARGE PLAN (ADULT/PEDIATRIC) - PROCEDURE
Patient reports extreme fatigue which she has moved up her echo to 6/18/25 (vs August). Sleeping well at night but will take naps on the weekends (cannot on the weekdays due to work). Sleeping 8-9 hours previously and now 10-12 hours a night. Wakes up \"exhausted\". Has noticed shortness of breath with stairs but not with regular activity, recovers quickly. Has limited her ability to exercise which she routinely walked. No change in reported history of atypical chest pain that occurs at night and not with activity/exercise.     Has noticed the past 2-3 weeks \"orange and brownish\" stools. No diarrhea. Reports history of celiacs but has noticed the color change. Denies black/bright red stools. (Hbg/hct 14.4/43.8 in June 2024).       Chest Port Insertion Detail Level: Detailed Quality 130: Documentation Of Current Medications In The Medical Record: Current Medications Documented

## 2025-06-03 ENCOUNTER — RX RENEWAL (OUTPATIENT)
Age: 55
End: 2025-06-03

## 2025-06-04 DIAGNOSIS — N17.9 ACUTE KIDNEY FAILURE, UNSPECIFIED: ICD-10-CM

## 2025-06-04 DIAGNOSIS — D86.9 SARCOIDOSIS, UNSPECIFIED: ICD-10-CM

## 2025-06-04 RX ORDER — HYDROXYCHLOROQUINE SULFATE 200 MG/1
200 TABLET, FILM COATED ORAL
Qty: 60 | Refills: 2 | Status: ACTIVE | COMMUNITY
Start: 2025-06-04 | End: 1900-01-01

## 2025-06-07 ENCOUNTER — APPOINTMENT (OUTPATIENT)
Dept: INFUSION THERAPY | Facility: HOSPITAL | Age: 55
End: 2025-06-07

## 2025-06-13 LAB
ANION GAP SERPL CALC-SCNC: 16 MMOL/L
BUN SERPL-MCNC: 24 MG/DL
CALCIUM SERPL-MCNC: 9.7 MG/DL
CHLORIDE SERPL-SCNC: 99 MMOL/L
CO2 SERPL-SCNC: 23 MMOL/L
CREAT SERPL-MCNC: 1.52 MG/DL
EGFRCR SERPLBLD CKD-EPI 2021: 41 ML/MIN/1.73M2
GLUCOSE SERPL-MCNC: 90 MG/DL
POTASSIUM SERPL-SCNC: 3.9 MMOL/L
SODIUM SERPL-SCNC: 139 MMOL/L

## 2025-06-22 ENCOUNTER — EMERGENCY (EMERGENCY)
Facility: HOSPITAL | Age: 55
LOS: 1 days | End: 2025-06-22
Attending: PERSONAL EMERGENCY RESPONSE ATTENDANT | Admitting: PERSONAL EMERGENCY RESPONSE ATTENDANT
Payer: COMMERCIAL

## 2025-06-22 VITALS
SYSTOLIC BLOOD PRESSURE: 172 MMHG | HEIGHT: 63 IN | WEIGHT: 195.11 LBS | RESPIRATION RATE: 18 BRPM | OXYGEN SATURATION: 98 % | HEART RATE: 79 BPM | DIASTOLIC BLOOD PRESSURE: 85 MMHG | TEMPERATURE: 98 F

## 2025-06-22 DIAGNOSIS — Z98.890 OTHER SPECIFIED POSTPROCEDURAL STATES: Chronic | ICD-10-CM

## 2025-06-22 LAB
ALBUMIN SERPL ELPH-MCNC: 4.1 G/DL — SIGNIFICANT CHANGE UP (ref 3.3–5)
ALP SERPL-CCNC: 68 U/L — SIGNIFICANT CHANGE UP (ref 40–120)
ALT FLD-CCNC: 42 U/L — HIGH (ref 4–33)
ANION GAP SERPL CALC-SCNC: 14 MMOL/L — SIGNIFICANT CHANGE UP (ref 7–14)
APPEARANCE UR: CLEAR — SIGNIFICANT CHANGE UP
AST SERPL-CCNC: 39 U/L — HIGH (ref 4–32)
BACTERIA # UR AUTO: NEGATIVE /HPF — SIGNIFICANT CHANGE UP
BASOPHILS # BLD AUTO: 0.02 K/UL — SIGNIFICANT CHANGE UP (ref 0–0.2)
BASOPHILS NFR BLD AUTO: 0.5 % — SIGNIFICANT CHANGE UP (ref 0–2)
BILIRUB SERPL-MCNC: 0.4 MG/DL — SIGNIFICANT CHANGE UP (ref 0.2–1.2)
BILIRUB UR-MCNC: NEGATIVE — SIGNIFICANT CHANGE UP
BUN SERPL-MCNC: 14 MG/DL — SIGNIFICANT CHANGE UP (ref 7–23)
CALCIUM SERPL-MCNC: 9.2 MG/DL — SIGNIFICANT CHANGE UP (ref 8.4–10.5)
CAST: 0 /LPF — SIGNIFICANT CHANGE UP (ref 0–4)
CHLORIDE SERPL-SCNC: 105 MMOL/L — SIGNIFICANT CHANGE UP (ref 98–107)
CO2 SERPL-SCNC: 18 MMOL/L — LOW (ref 22–31)
COLOR SPEC: YELLOW — SIGNIFICANT CHANGE UP
CREAT ?TM UR-MCNC: 14 MG/DL — SIGNIFICANT CHANGE UP
CREAT SERPL-MCNC: 1.21 MG/DL — SIGNIFICANT CHANGE UP (ref 0.5–1.3)
DIFF PNL FLD: NEGATIVE — SIGNIFICANT CHANGE UP
EGFR: 54 ML/MIN/1.73M2 — LOW
EGFR: 54 ML/MIN/1.73M2 — LOW
EOSINOPHIL # BLD AUTO: 0.06 K/UL — SIGNIFICANT CHANGE UP (ref 0–0.5)
EOSINOPHIL NFR BLD AUTO: 1.5 % — SIGNIFICANT CHANGE UP (ref 0–6)
GLUCOSE SERPL-MCNC: 94 MG/DL — SIGNIFICANT CHANGE UP (ref 70–99)
GLUCOSE UR QL: NEGATIVE MG/DL — SIGNIFICANT CHANGE UP
HCT VFR BLD CALC: 32 % — LOW (ref 34.5–45)
HGB BLD-MCNC: 10.4 G/DL — LOW (ref 11.5–15.5)
IMM GRANULOCYTES # BLD AUTO: 0.02 K/UL — SIGNIFICANT CHANGE UP (ref 0–0.07)
IMM GRANULOCYTES NFR BLD AUTO: 0.5 % — SIGNIFICANT CHANGE UP (ref 0–0.9)
KETONES UR QL: NEGATIVE MG/DL — SIGNIFICANT CHANGE UP
LEUKOCYTE ESTERASE UR-ACNC: NEGATIVE — SIGNIFICANT CHANGE UP
LYMPHOCYTES # BLD AUTO: 1.39 K/UL — SIGNIFICANT CHANGE UP (ref 1–3.3)
LYMPHOCYTES NFR BLD AUTO: 34.6 % — SIGNIFICANT CHANGE UP (ref 13–44)
MAGNESIUM SERPL-MCNC: 1.7 MG/DL — SIGNIFICANT CHANGE UP (ref 1.6–2.6)
MCHC RBC-ENTMCNC: 30.1 PG — SIGNIFICANT CHANGE UP (ref 27–34)
MCHC RBC-ENTMCNC: 32.5 G/DL — SIGNIFICANT CHANGE UP (ref 32–36)
MCV RBC AUTO: 92.5 FL — SIGNIFICANT CHANGE UP (ref 80–100)
MONOCYTES # BLD AUTO: 0.53 K/UL — SIGNIFICANT CHANGE UP (ref 0–0.9)
MONOCYTES NFR BLD AUTO: 13.2 % — SIGNIFICANT CHANGE UP (ref 2–14)
NEUTROPHILS # BLD AUTO: 2 K/UL — SIGNIFICANT CHANGE UP (ref 1.8–7.4)
NEUTROPHILS NFR BLD AUTO: 49.7 % — SIGNIFICANT CHANGE UP (ref 43–77)
NITRITE UR-MCNC: NEGATIVE — SIGNIFICANT CHANGE UP
NRBC # BLD AUTO: 0 K/UL — SIGNIFICANT CHANGE UP (ref 0–0)
NRBC # FLD: 0 K/UL — SIGNIFICANT CHANGE UP (ref 0–0)
NRBC BLD AUTO-RTO: 0 /100 WBCS — SIGNIFICANT CHANGE UP (ref 0–0)
OSMOLALITY UR: 66 MOSM/KG — SIGNIFICANT CHANGE UP (ref 50–1200)
PH UR: 6 — SIGNIFICANT CHANGE UP (ref 5–8)
PHOSPHATE SERPL-MCNC: 3.4 MG/DL — SIGNIFICANT CHANGE UP (ref 2.5–4.5)
PLATELET # BLD AUTO: 221 K/UL — SIGNIFICANT CHANGE UP (ref 150–400)
PMV BLD: 10.6 FL — SIGNIFICANT CHANGE UP (ref 7–13)
POTASSIUM SERPL-MCNC: 4.1 MMOL/L — SIGNIFICANT CHANGE UP (ref 3.5–5.3)
POTASSIUM SERPL-SCNC: 4.1 MMOL/L — SIGNIFICANT CHANGE UP (ref 3.5–5.3)
POTASSIUM UR-SCNC: <20 MMOL/L — SIGNIFICANT CHANGE UP
PROT ?TM UR-MCNC: <4 MG/DL — SIGNIFICANT CHANGE UP
PROT SERPL-MCNC: 7.4 G/DL — SIGNIFICANT CHANGE UP (ref 6–8.3)
PROT UR-MCNC: NEGATIVE MG/DL — SIGNIFICANT CHANGE UP
PROT/CREAT UR-RTO: SIGNIFICANT CHANGE UP RATIO (ref 0–0.2)
RBC # BLD: 3.46 M/UL — LOW (ref 3.8–5.2)
RBC # FLD: 13.9 % — SIGNIFICANT CHANGE UP (ref 10.3–14.5)
RBC CASTS # UR COMP ASSIST: 1 /HPF — SIGNIFICANT CHANGE UP (ref 0–4)
REVIEW: SIGNIFICANT CHANGE UP
SODIUM SERPL-SCNC: 137 MMOL/L — SIGNIFICANT CHANGE UP (ref 135–145)
SODIUM UR-SCNC: <20 MMOL/L — SIGNIFICANT CHANGE UP
SP GR SPEC: 1 — LOW (ref 1–1.03)
SQUAMOUS # UR AUTO: 1 /HPF — SIGNIFICANT CHANGE UP (ref 0–5)
UROBILINOGEN FLD QL: 0.2 MG/DL — SIGNIFICANT CHANGE UP (ref 0.2–1)
UUN UR-MCNC: 77.8 MG/DL — SIGNIFICANT CHANGE UP
WBC # BLD: 4.02 K/UL — SIGNIFICANT CHANGE UP (ref 3.8–10.5)
WBC # FLD AUTO: 4.02 K/UL — SIGNIFICANT CHANGE UP (ref 3.8–10.5)
WBC UR QL: 0 /HPF — SIGNIFICANT CHANGE UP (ref 0–5)

## 2025-06-22 PROCEDURE — 93010 ELECTROCARDIOGRAM REPORT: CPT

## 2025-06-22 PROCEDURE — 76770 US EXAM ABDO BACK WALL COMP: CPT | Mod: 26

## 2025-06-22 PROCEDURE — 99285 EMERGENCY DEPT VISIT HI MDM: CPT

## 2025-06-22 PROCEDURE — 93970 EXTREMITY STUDY: CPT | Mod: 26

## 2025-06-22 NOTE — ED PROVIDER NOTE - NSPTACCESSSVCSAPPTDETAILS_ED_ALL_ED_FT
sent by rheum for BIJAN on outpatient labs, in ED no BIJAN but R renal fullness on US. needs nephrology f/u

## 2025-06-22 NOTE — ED ADULT NURSE NOTE - CCCP TRG CHIEF CMPLNT
Perfect served by mdru re pt complaining of pain below insertion site and small lump below dressing. Removed pt's dressing and cleaned dried blood. There does appear to be a small lump or bump below the insertion site but was present before insertion. Pt also has a lump/bump in left upper arm. Clemente Rodriguez, iv team also came to inspect arm. Line draws blood as it should and flushes easily. Site redressed. abnormal labs

## 2025-06-22 NOTE — ED PROVIDER NOTE - CLINICAL SUMMARY MEDICAL DECISION MAKING FREE TEXT BOX
EM PGY-2/Kevin DO: 54-year-old female PMHx R BRCA s/p radiation (2024), chemo (2023)  on tamoxifen, HTN, sarcoidosis presents to ED sent by rheumatology outpatient labs showing elevated BUN and creatinine 24/1.52.  Patient states that she had elevated kidney function in March which improved however has risen again now.  She does endorse drinking lots of protein over the past 2 months.  Patient also mentions that they returned from [place] yesterday and began noticing swelling in the legs around 2 days ago,  states they appear equal in size.  Denies any history of PE/DVT, hemoptysis, pleuritic pain, SOB, leg pain, CP, palpitations. Otherwise denies any cough, congestion, N/V/D, abd pain, back pain, urinary symptoms. Currently denies any sources of pain or discomfort and otherwise endorses being in nml state of health with adequate PO intake, voids, and BMs.    MDM: 54-year-old female with above-mentioned history presenting to ED for outpatient labs showing worsening kidney function, also 2 days of lower extremity swelling after recent travel to [place].  On exam patient is awake, alert, walking around room prior to exam, speaking in full sentences with appropriate phonation, NAD.  EOMI, conjunctiva sclera clear, CV RRR, no MGR JVD appreciated, lungs CTAB, no wheezes rales or crackles, no increased WOB.  Abdomen soft, nontender with no peritoneal signs, no flank TTP.  Lower extremities slightly edematous nonpitting, left appears slightly larger than right, UE/LE pulses 2+ bilaterally, no visible lesions or rashes, moving all extremities, grossly neurologically intact.  DDx includes new onset CKD possibly of rheumatologic origin, will assess for electrolyte abnormalities, urine lites, renal ultrasound, story also concerning for DVT, not describing any signs or symptoms of PE however given risk factors will ultrasound both legs.  Will call provider who sent in and determine dispo.

## 2025-06-22 NOTE — ED ADULT NURSE NOTE - OBJECTIVE STATEMENT
Daniele RN: Pt received to room 24 A&OX4 ambulatory c/o elevated BUN/creatine levels in routine labs. Pt denies decrease in urine output. Reports she does not drink a lot of water. Pt s/p chemo and radiation, R sided mastectomy for breast Ca 2024. Pt in no acute distress. Unable to obtain IV access. Labs drawn and sent. Primary RN Rosaura aware. Pending US. Family at bedside.

## 2025-06-22 NOTE — ED PROVIDER NOTE - ATTENDING CONTRIBUTION TO CARE
Attending MD Castellanos:  I performed a history and physical exam of the patient and discussed their management with the resident. I reviewed the resident's note and agree with the documented findings and plan of care. My medical decision making and observations are found above.    Attending MD Castellanos.  Agree with above.  Pt is a 53 yo fem with pmhx of R breast CA on tamoxifem, sarcoidosis, HLD, HTN who presents to ED after found on outpt labs to have elevated Cr.  Pt does endorse returning from [place] yesterday and noted bilateral LE edema since yesterday.  On exam L>R mildly.  Pt had outpt labs demonstrating elevated Cr/BUN again after BIJAN in March had resolved.  Pt does endorse increased protein over last 2 mos.  No CP/SOB/palpitations.  Planned screening labs and management per findings.

## 2025-06-22 NOTE — ED ADULT NURSE NOTE - NSFALLUNIVINTERV_ED_ALL_ED
Bed/Stretcher in lowest position, wheels locked, appropriate side rails in place/Call bell, personal items and telephone in reach/Instruct patient to call for assistance before getting out of bed/chair/stretcher/Non-slip footwear applied when patient is off stretcher/[place] to call system/Physically safe environment - no spills, clutter or unnecessary equipment/Purposeful proactive rounding/Room/bathroom lighting operational, light cord in reach

## 2025-06-22 NOTE — ED ADULT NURSE NOTE - SUICIDE SCREENING QUESTION 1
Patient called today with regards to the following prescription request: Current    Provider: Rhonda Singleton MD  Medication: sildenafil  Strength: 100 mg  Dosing Instructions: as needed  Supply Requested:    Pharmacy:   [id number] [place], [place] - [address]  [address]  [facility] [address]  Phone: [phone] Fax: [phone]      For additional information, or if you need to contact the caller at the following number:    Contact Phone Number:   Mobile [phone]       Patient states that it is okay to leave a detailed message.    Preferred time for callback: any    Patient was instructed to call pharmacy directly for future refills.    Advised Patient that refill processing may take 48-72 hours and that the pharmacy will contact them when their prescription is ready for pickup.  
Patient calling with questions/issues in regards to the following medication/s:   sildenafil  Patient states he called pharmacy and prescription was not there. Please re send.    If patient would require a prescription, please us the following Pharmacy:   [id number] - [place], [place] - [address] AT [facility] OF [address]  [address]  [facility] [address]  Phone: [phone] Fax: [phone]       Please call patient at the following number:  Mobile [phone]     Patient states that it is okay to leave a detailed message.    Patient was informed that the patient would receive a phone call back within 24-48 hours unless this request is STAT.  
Patient was informed that medication was already sent to pharmacy on 9/16.   
No

## 2025-06-22 NOTE — ED PROVIDER NOTE - PROGRESS NOTE DETAILS
EM PGY-2/Kevin DO: Discussed pt presentation with rheum fellow on call Dr. Cooley. Pt w/o BIJAN, GFR 54, slightly elevated LFTs, US neg for DVT, US renal showing fullness of R kidney. Awaiting call back and recommendations from rheum. EM PGY-2/Kevin DO: D/w rheum fellow Dr. Cooley, states as kidney function wnl, pt can follow up outpatient and recommended nephrology referral which will d/c pt with. Informed pt of R renal fullness and mildly elevated LFTs, recommended PMD f/u. I have discussed all results, discharge instructions, strict return precautions and need for follow up with patient. They have verbalized understanding and agreement to plan at this time. Amenable to discharge.

## 2025-06-22 NOTE — ED PROVIDER NOTE - NSFOLLOWUPINSTRUCTIONS_ED_ALL_ED_FT
You have been evaluated in the Emergency Department today for ABNORMAL LAB RESULT. Your evaluation did not show evidence of medical conditions requiring emergent intervention at this time. The results of your labs and imaging are included in your discharge paperwork. Bring this paperwork with you to any follow up appointments to show to your doctors.    Follow up with your rheumatologist at your next scheduled visit. Please follow up with your primary care physician within two days.    I have notified someone in our system to contact you within the next 2-3 business days to help set up an appointment with a NEPHROLOGIST (KIDNEY DOCTOR) Please schedule that appointment as soon as you are able to. If you do not hear from them in the next few business days, please call the number listed on the first page of your discharge paperwork.    UNLESS OTHERWISE DIRECTED BY YOUR PRIMARY DOCTOR, for discomfort at home, you can alternate between 600mg ibuprofen (Motrin, Aleve, Advil, etc.) and 650-975mg acetaminophen (Tylenol) every 6-8 hours. If your pain is severe, you can take them both together at the same time. Please do not exceed 3200mg ibuprofen or 4000mg Tylenol in one day. Please consult with your primary doctor before taking any medications on a regular basis.    Return to the Emergency Department if you experience worsening or uncontrolled pain, worsened leg swelling, urinating blood, rashes, or any new, worsening, or concerning symptoms.

## 2025-06-22 NOTE — ED PROVIDER NOTE - PATIENT PORTAL LINK FT
You can access the FollowMyHealth Patient Portal offered by [facility] by registering at the following website: [email or web address]. By joining [facility]’s FollowMyHealth portal, you will also be able to view your health information using other applications (apps) compatible with our system.

## 2025-06-22 NOTE — ED PROVIDER NOTE - SPECIALTY CARE
Nephrology O-L Flap Text: The defect edges were debeveled with a #15 scalpel blade.  Given the location of the defect, shape of the defect and the proximity to free margins an O-L flap was deemed most appropriate.  Using a sterile surgical marker, an appropriate advancement flap was drawn incorporating the defect and placing the expected incisions within the relaxed skin tension lines where possible.    The area thus outlined was incised deep to adipose tissue with a #15 scalpel blade.  The skin margins were undermined to an appropriate distance in all directions utilizing iris scissors.

## 2025-06-22 NOTE — ED ADULT TRIAGE NOTE - CHIEF COMPLAINT QUOTE
pt with hx rt breast ca, on oral chemo, sent by MD for abnormal labs, high creatinine, pt c/o od swelling to bilateral lower extremities, denies chest pain ir sob

## 2025-06-22 NOTE — ED PROVIDER NOTE - PHYSICAL EXAMINATION
awake, alert, walking around room prior to exam, speaking in full sentences with appropriate phonation, NAD.  EOMI, conjunctiva sclera clear, CV RRR, no MGR JVD appreciated, lungs CTAB, no wheezes rales or crackles, no increased WOB.  Abdomen soft, nontender with no peritoneal signs, no flank TTP.  Lower extremities slightly edematous nonpitting, left appears slightly larger than right, UE/LE pulses 2+ bilaterally, no visible lesions or rashes, moving all extremities, grossly neurologically intact.

## 2025-06-29 ENCOUNTER — OUTPATIENT (OUTPATIENT)
Dept: OUTPATIENT SERVICES | Facility: HOSPITAL | Age: 55
LOS: 1 days | Discharge: HOME | End: 2025-06-29

## 2025-06-29 DIAGNOSIS — Z98.890 OTHER SPECIFIED POSTPROCEDURAL STATES: Chronic | ICD-10-CM

## 2025-06-29 DIAGNOSIS — C50.911 MALIGNANT NEOPLASM OF UNSPECIFIED SITE OF RIGHT FEMALE BREAST: ICD-10-CM

## 2025-07-05 ENCOUNTER — RESULT REVIEW (OUTPATIENT)
Age: 55
End: 2025-07-05

## 2025-07-05 ENCOUNTER — APPOINTMENT (OUTPATIENT)
Dept: INFUSION THERAPY | Facility: HOSPITAL | Age: 55
End: 2025-07-05

## 2025-07-05 LAB
24R-OH-CALCIDIOL SERPL-MCNC: 43.6 NG/ML — SIGNIFICANT CHANGE UP
ALBUMIN SERPL ELPH-MCNC: 4.3 G/DL — SIGNIFICANT CHANGE UP (ref 3.3–5)
ALP SERPL-CCNC: 70 U/L — SIGNIFICANT CHANGE UP (ref 40–120)
ALT FLD-CCNC: 26 U/L — SIGNIFICANT CHANGE UP (ref 10–45)
ANION GAP SERPL CALC-SCNC: 14 MMOL/L — SIGNIFICANT CHANGE UP (ref 5–17)
AST SERPL-CCNC: 26 U/L — SIGNIFICANT CHANGE UP (ref 10–40)
BASOPHILS # BLD AUTO: 0.03 K/UL — SIGNIFICANT CHANGE UP (ref 0–0.2)
BASOPHILS NFR BLD AUTO: 0.7 % — SIGNIFICANT CHANGE UP (ref 0–2)
BILIRUB SERPL-MCNC: 0.4 MG/DL — SIGNIFICANT CHANGE UP (ref 0.2–1.2)
BUN SERPL-MCNC: 16 MG/DL — SIGNIFICANT CHANGE UP (ref 7–23)
CALCIUM SERPL-MCNC: 9.4 MG/DL — SIGNIFICANT CHANGE UP (ref 8.4–10.5)
CHLORIDE SERPL-SCNC: 103 MMOL/L — SIGNIFICANT CHANGE UP (ref 96–108)
CO2 SERPL-SCNC: 22 MMOL/L — SIGNIFICANT CHANGE UP (ref 22–31)
CREAT SERPL-MCNC: 1.1 MG/DL — SIGNIFICANT CHANGE UP (ref 0.5–1.3)
EGFR: 60 ML/MIN/1.73M2 — SIGNIFICANT CHANGE UP
EGFR: 60 ML/MIN/1.73M2 — SIGNIFICANT CHANGE UP
EOSINOPHIL # BLD AUTO: 0.05 K/UL — SIGNIFICANT CHANGE UP (ref 0–0.5)
EOSINOPHIL NFR BLD AUTO: 1.2 % — SIGNIFICANT CHANGE UP (ref 0–6)
GLUCOSE SERPL-MCNC: 88 MG/DL — SIGNIFICANT CHANGE UP (ref 70–99)
HCT VFR BLD CALC: 32.1 % — LOW (ref 34.5–45)
HGB BLD-MCNC: 10.6 G/DL — LOW (ref 11.5–15.5)
IMM GRANULOCYTES NFR BLD AUTO: 0.2 % — SIGNIFICANT CHANGE UP (ref 0–0.9)
LYMPHOCYTES # BLD AUTO: 1.82 K/UL — SIGNIFICANT CHANGE UP (ref 1–3.3)
LYMPHOCYTES # BLD AUTO: 44.1 % — HIGH (ref 13–44)
MCHC RBC-ENTMCNC: 29.8 PG — SIGNIFICANT CHANGE UP (ref 27–34)
MCHC RBC-ENTMCNC: 33 G/DL — SIGNIFICANT CHANGE UP (ref 32–36)
MCV RBC AUTO: 90.2 FL — SIGNIFICANT CHANGE UP (ref 80–100)
MONOCYTES # BLD AUTO: 0.42 K/UL — SIGNIFICANT CHANGE UP (ref 0–0.9)
MONOCYTES NFR BLD AUTO: 10.2 % — SIGNIFICANT CHANGE UP (ref 2–14)
NEUTROPHILS # BLD AUTO: 1.8 K/UL — SIGNIFICANT CHANGE UP (ref 1.8–7.4)
NEUTROPHILS NFR BLD AUTO: 43.6 % — SIGNIFICANT CHANGE UP (ref 43–77)
NRBC BLD AUTO-RTO: 0 /100 WBCS — SIGNIFICANT CHANGE UP (ref 0–0)
PLATELET # BLD AUTO: 240 K/UL — SIGNIFICANT CHANGE UP (ref 150–400)
POTASSIUM SERPL-MCNC: 3.9 MMOL/L — SIGNIFICANT CHANGE UP (ref 3.5–5.3)
POTASSIUM SERPL-SCNC: 3.9 MMOL/L — SIGNIFICANT CHANGE UP (ref 3.5–5.3)
PROT SERPL-MCNC: 7.4 G/DL — SIGNIFICANT CHANGE UP (ref 6–8.3)
RBC # BLD: 3.56 M/UL — LOW (ref 3.8–5.2)
RBC # FLD: 13.4 % — SIGNIFICANT CHANGE UP (ref 10.3–14.5)
SODIUM SERPL-SCNC: 139 MMOL/L — SIGNIFICANT CHANGE UP (ref 135–145)
WBC # BLD: 4.13 K/UL — SIGNIFICANT CHANGE UP (ref 3.8–10.5)
WBC # FLD AUTO: 4.13 K/UL — SIGNIFICANT CHANGE UP (ref 3.8–10.5)

## 2025-07-10 ENCOUNTER — APPOINTMENT (OUTPATIENT)
Dept: SURGICAL ONCOLOGY | Facility: CLINIC | Age: 55
End: 2025-07-10
Payer: COMMERCIAL

## 2025-07-10 VITALS — HEIGHT: 63 IN | WEIGHT: 195 LBS | BODY MASS INDEX: 34.55 KG/M2

## 2025-07-10 PROCEDURE — 99215 OFFICE O/P EST HI 40 MIN: CPT

## 2025-08-01 ENCOUNTER — OUTPATIENT (OUTPATIENT)
Dept: OUTPATIENT SERVICES | Facility: HOSPITAL | Age: 55
LOS: 1 days | End: 2025-08-01

## 2025-08-01 VITALS
TEMPERATURE: 98 F | RESPIRATION RATE: 18 BRPM | HEIGHT: 63 IN | DIASTOLIC BLOOD PRESSURE: 75 MMHG | SYSTOLIC BLOOD PRESSURE: 112 MMHG | HEART RATE: 88 BPM | WEIGHT: 194.01 LBS | OXYGEN SATURATION: 100 %

## 2025-08-01 DIAGNOSIS — Z85.3 PERSONAL HISTORY OF MALIGNANT NEOPLASM OF BREAST: ICD-10-CM

## 2025-08-01 DIAGNOSIS — Z90.710 ACQUIRED ABSENCE OF BOTH CERVIX AND UTERUS: Chronic | ICD-10-CM

## 2025-08-01 DIAGNOSIS — Z98.890 OTHER SPECIFIED POSTPROCEDURAL STATES: Chronic | ICD-10-CM

## 2025-08-01 RX ORDER — SODIUM CHLORIDE 9 G/1000ML
1000 INJECTION, SOLUTION INTRAVENOUS
Refills: 0 | Status: DISCONTINUED | OUTPATIENT
Start: 2025-08-11 | End: 2025-08-25

## 2025-08-11 ENCOUNTER — APPOINTMENT (OUTPATIENT)
Dept: PLASTIC SURGERY | Facility: HOSPITAL | Age: 55
End: 2025-08-11

## 2025-08-11 ENCOUNTER — RESULT REVIEW (OUTPATIENT)
Age: 55
End: 2025-08-11

## 2025-08-11 ENCOUNTER — TRANSCRIPTION ENCOUNTER (OUTPATIENT)
Age: 55
End: 2025-08-11

## 2025-08-11 ENCOUNTER — OUTPATIENT (OUTPATIENT)
Dept: OUTPATIENT SERVICES | Facility: HOSPITAL | Age: 55
LOS: 1 days | End: 2025-08-11
Payer: COMMERCIAL

## 2025-08-11 ENCOUNTER — APPOINTMENT (OUTPATIENT)
Dept: SURGICAL ONCOLOGY | Facility: AMBULATORY SURGERY CENTER | Age: 55
End: 2025-08-11

## 2025-08-11 VITALS
HEART RATE: 71 BPM | DIASTOLIC BLOOD PRESSURE: 78 MMHG | OXYGEN SATURATION: 99 % | RESPIRATION RATE: 18 BRPM | SYSTOLIC BLOOD PRESSURE: 118 MMHG

## 2025-08-11 VITALS
RESPIRATION RATE: 17 BRPM | WEIGHT: 194.01 LBS | TEMPERATURE: 97 F | SYSTOLIC BLOOD PRESSURE: 138 MMHG | HEIGHT: 63 IN | OXYGEN SATURATION: 100 % | DIASTOLIC BLOOD PRESSURE: 87 MMHG | HEART RATE: 72 BPM

## 2025-08-11 DIAGNOSIS — Z98.890 OTHER SPECIFIED POSTPROCEDURAL STATES: Chronic | ICD-10-CM

## 2025-08-11 DIAGNOSIS — Z90.710 ACQUIRED ABSENCE OF BOTH CERVIX AND UTERUS: Chronic | ICD-10-CM

## 2025-08-11 DIAGNOSIS — Z85.3 PERSONAL HISTORY OF MALIGNANT NEOPLASM OF BREAST: ICD-10-CM

## 2025-08-11 LAB — POTASSIUM BLDV-SCNC: 4.2 MMOL/L — SIGNIFICANT CHANGE UP (ref 3.5–5.1)

## 2025-08-11 PROCEDURE — 36590 REMOVAL TUNNELED CV CATH: CPT

## 2025-08-11 PROCEDURE — 88300 SURGICAL PATH GROSS: CPT | Mod: 26,59

## 2025-08-11 PROCEDURE — 15772 GRFG AUTOL FAT LIPO EA ADDL: CPT

## 2025-08-11 PROCEDURE — 88304 TISSUE EXAM BY PATHOLOGIST: CPT | Mod: 26

## 2025-08-11 PROCEDURE — 15771 GRFG AUTOL FAT LIPO 50 CC/<: CPT

## 2025-08-11 RX ORDER — BRINZOLAMIDE/BRIMONIDINE TARTRATE 10; 2 MG/ML; MG/ML
2 SUSPENSION/ DROPS OPHTHALMIC
Refills: 0 | DISCHARGE

## 2025-08-11 RX ORDER — HYDROXYCHLOROQUINE SULFATE 200 MG/1
1 TABLET, FILM COATED ORAL
Refills: 0 | DISCHARGE

## 2025-08-11 RX ORDER — LIDOCAINE HYDROCHLORIDE 20 MG/ML
1 JELLY TOPICAL
Refills: 0 | DISCHARGE

## 2025-08-11 RX ORDER — MELOXICAM 15 MG/1
1 TABLET ORAL
Refills: 0 | DISCHARGE

## 2025-08-11 RX ORDER — DORZOLAMIDE HYDROCHLORIDE AND TIMOLOL MALEATE 20; 5 MG/ML; MG/ML
2 SOLUTION/ DROPS OPHTHALMIC
Refills: 0 | DISCHARGE

## 2025-08-11 RX ORDER — LATANOPROST PF 0.05 MG/ML
2 SOLUTION/ DROPS OPHTHALMIC
Refills: 0 | DISCHARGE

## 2025-08-11 RX ORDER — TRIAMTERENE/HYDROCHLOROTHIAZID 50 MG-25MG
1 CAPSULE ORAL
Refills: 0 | DISCHARGE
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
## 2025-08-12 PROBLEM — E66.9 OBESITY, UNSPECIFIED: Chronic | Status: ACTIVE | Noted: 2025-08-01

## 2025-08-12 PROBLEM — D86.9 SARCOIDOSIS, UNSPECIFIED: Chronic | Status: ACTIVE | Noted: 2025-08-01

## 2025-08-12 PROBLEM — Z92.3 PERSONAL HISTORY OF IRRADIATION: Chronic | Status: ACTIVE | Noted: 2025-08-01

## 2025-08-12 PROBLEM — Z92.21 PERSONAL HISTORY OF ANTINEOPLASTIC CHEMOTHERAPY: Chronic | Status: ACTIVE | Noted: 2025-08-01

## 2025-08-12 PROBLEM — H40.9 UNSPECIFIED GLAUCOMA: Chronic | Status: ACTIVE | Noted: 2025-08-01

## 2025-08-20 ENCOUNTER — APPOINTMENT (OUTPATIENT)
Dept: PLASTIC SURGERY | Facility: CLINIC | Age: 55
End: 2025-08-20
Payer: COMMERCIAL

## 2025-08-20 VITALS
OXYGEN SATURATION: 90 % | TEMPERATURE: 97.5 F | DIASTOLIC BLOOD PRESSURE: 80 MMHG | RESPIRATION RATE: 17 BRPM | BODY MASS INDEX: 34.55 KG/M2 | HEIGHT: 63 IN | WEIGHT: 195 LBS | SYSTOLIC BLOOD PRESSURE: 117 MMHG | HEART RATE: 76 BPM

## 2025-08-20 PROCEDURE — 99024 POSTOP FOLLOW-UP VISIT: CPT

## 2025-08-21 LAB — SURGICAL PATHOLOGY STUDY: SIGNIFICANT CHANGE UP

## 2025-08-26 ENCOUNTER — APPOINTMENT (OUTPATIENT)
Dept: HEMATOLOGY ONCOLOGY | Facility: CLINIC | Age: 55
End: 2025-08-26
Payer: COMMERCIAL

## 2025-08-26 VITALS
BODY MASS INDEX: 34.44 KG/M2 | TEMPERATURE: 97 F | RESPIRATION RATE: 16 BRPM | WEIGHT: 194.43 LBS | OXYGEN SATURATION: 99 % | HEART RATE: 71 BPM | SYSTOLIC BLOOD PRESSURE: 117 MMHG | DIASTOLIC BLOOD PRESSURE: 74 MMHG

## 2025-08-26 DIAGNOSIS — C50.911 MALIGNANT NEOPLASM OF UNSPECIFIED SITE OF RIGHT FEMALE BREAST: ICD-10-CM

## 2025-08-26 PROCEDURE — 99214 OFFICE O/P EST MOD 30 MIN: CPT

## 2025-08-30 ENCOUNTER — APPOINTMENT (OUTPATIENT)
Dept: INFUSION THERAPY | Facility: HOSPITAL | Age: 55
End: 2025-08-30

## 2025-09-12 ENCOUNTER — APPOINTMENT (OUTPATIENT)
Dept: PLASTIC SURGERY | Facility: CLINIC | Age: 55
End: 2025-09-12
Payer: COMMERCIAL

## 2025-09-12 VITALS
TEMPERATURE: 97.3 F | OXYGEN SATURATION: 100 % | HEART RATE: 78 BPM | DIASTOLIC BLOOD PRESSURE: 82 MMHG | HEIGHT: 63 IN | SYSTOLIC BLOOD PRESSURE: 118 MMHG | BODY MASS INDEX: 34.55 KG/M2 | WEIGHT: 195 LBS

## 2025-09-12 PROCEDURE — 99024 POSTOP FOLLOW-UP VISIT: CPT

## (undated) DEVICE — SUT MONOCRYL 4-0 27" PS-2 UNDYED

## (undated) DEVICE — SPECIMEN CONTAINER 4OZ

## (undated) DEVICE — ELCTR BOVIE TIP BLADE INSULATED 4" EDGE

## (undated) DEVICE — WARMING BLANKET LOWER ADULT

## (undated) DEVICE — PACK MINOR NO DRAPE

## (undated) DEVICE — Device

## (undated) DEVICE — SUT SILK 2-0 30" SH

## (undated) DEVICE — FRAZIER SUCTION TIP 10FR

## (undated) DEVICE — DRAPE CHEST BREAST 106" X 122"

## (undated) DEVICE — DRSG STERISTRIPS 0.5 X 4"

## (undated) DEVICE — SUT MONOCRYL 3-0 27" PS-2 UNDYED

## (undated) DEVICE — POSITIONER FOAM EGG CRATE ULNAR 2PCS (PINK)

## (undated) DEVICE — SOL IRR POUR NS 0.9% 500ML

## (undated) DEVICE — SOL INJ NS 0.9% 50ML

## (undated) DEVICE — POSITIONER PATIENT SAFETY STRAP 3X60"

## (undated) DEVICE — SUT SILK 2-0 18" FS

## (undated) DEVICE — DRAPE C ARM UNIVERSAL

## (undated) DEVICE — DRAPE INSTRUMENT POUCH 6.75" X 11"

## (undated) DEVICE — ELCTR ROCKER SWITCH PENCIL BLUE 10FT

## (undated) DEVICE — BAG DECANTER IV STERILE

## (undated) DEVICE — RADIOGRAPHY DVC SPEC TRANSPEC

## (undated) DEVICE — BLADE SURGICAL #11 CARBON

## (undated) DEVICE — VENODYNE/SCD SLEEVE CALF MEDIUM

## (undated) DEVICE — DRAPE CAMERA ENDOMATE

## (undated) DEVICE — GOWN LG

## (undated) DEVICE — ELCTR GROUNDING PAD ADULT COVIDIEN

## (undated) DEVICE — GLV 7.5 PROTEXIS (WHITE)

## (undated) DEVICE — GLV 7 PROTEXIS (WHITE)

## (undated) DEVICE — ELCTR BOVIE TIP BLADE INSULATED 2.75" EDGE

## (undated) DEVICE — POSITIONER STRAP ARMBOARD VELCRO TS-30

## (undated) DEVICE — SYR LUER LOK 20CC

## (undated) DEVICE — DRAPE 3/4 SHEET 52X76"

## (undated) DEVICE — LIGASURE SMALL JAW

## (undated) DEVICE — DRAPE TOWEL BLUE 17" X 24"

## (undated) DEVICE — SYR LUER LOK 10CC

## (undated) DEVICE — LAP PAD W RING 18 X 18"

## (undated) DEVICE — DRSG TEGADERM 4X4.75"

## (undated) DEVICE — SUT VICRYL 3-0 27" SH UNDYED

## (undated) DEVICE — SUT MONOCRYL 3-0 18" PS-2 UNDYED